# Patient Record
Sex: FEMALE | Race: OTHER | NOT HISPANIC OR LATINO | URBAN - METROPOLITAN AREA
[De-identification: names, ages, dates, MRNs, and addresses within clinical notes are randomized per-mention and may not be internally consistent; named-entity substitution may affect disease eponyms.]

---

## 2019-10-10 ENCOUNTER — EMERGENCY (EMERGENCY)
Facility: HOSPITAL | Age: 24
LOS: 0 days | Discharge: HOME | End: 2019-10-10
Attending: EMERGENCY MEDICINE | Admitting: EMERGENCY MEDICINE
Payer: COMMERCIAL

## 2019-10-10 VITALS
OXYGEN SATURATION: 100 % | RESPIRATION RATE: 16 BRPM | TEMPERATURE: 98 F | DIASTOLIC BLOOD PRESSURE: 80 MMHG | SYSTOLIC BLOOD PRESSURE: 114 MMHG

## 2019-10-10 VITALS
HEART RATE: 90 BPM | OXYGEN SATURATION: 98 % | DIASTOLIC BLOOD PRESSURE: 72 MMHG | WEIGHT: 125 LBS | TEMPERATURE: 96 F | HEIGHT: 65 IN | RESPIRATION RATE: 16 BRPM | SYSTOLIC BLOOD PRESSURE: 130 MMHG

## 2019-10-10 DIAGNOSIS — R11.0 NAUSEA: ICD-10-CM

## 2019-10-10 DIAGNOSIS — R51 HEADACHE: ICD-10-CM

## 2019-10-10 DIAGNOSIS — H53.8 OTHER VISUAL DISTURBANCES: ICD-10-CM

## 2019-10-10 LAB
ALBUMIN SERPL ELPH-MCNC: 4.8 G/DL — SIGNIFICANT CHANGE UP (ref 3.5–5.2)
ALP SERPL-CCNC: 50 U/L — SIGNIFICANT CHANGE UP (ref 30–115)
ALT FLD-CCNC: 10 U/L — SIGNIFICANT CHANGE UP (ref 0–41)
ANION GAP SERPL CALC-SCNC: 15 MMOL/L — HIGH (ref 7–14)
AST SERPL-CCNC: 15 U/L — SIGNIFICANT CHANGE UP (ref 0–41)
BASOPHILS # BLD AUTO: 0.05 K/UL — SIGNIFICANT CHANGE UP (ref 0–0.2)
BASOPHILS NFR BLD AUTO: 0.7 % — SIGNIFICANT CHANGE UP (ref 0–1)
BILIRUB SERPL-MCNC: 0.3 MG/DL — SIGNIFICANT CHANGE UP (ref 0.2–1.2)
BUN SERPL-MCNC: 11 MG/DL — SIGNIFICANT CHANGE UP (ref 10–20)
CALCIUM SERPL-MCNC: 9.7 MG/DL — SIGNIFICANT CHANGE UP (ref 8.5–10.1)
CHLORIDE SERPL-SCNC: 99 MMOL/L — SIGNIFICANT CHANGE UP (ref 98–110)
CO2 SERPL-SCNC: 20 MMOL/L — SIGNIFICANT CHANGE UP (ref 17–32)
CREAT SERPL-MCNC: 0.6 MG/DL — LOW (ref 0.7–1.5)
EOSINOPHIL # BLD AUTO: 0.22 K/UL — SIGNIFICANT CHANGE UP (ref 0–0.7)
EOSINOPHIL NFR BLD AUTO: 3.1 % — SIGNIFICANT CHANGE UP (ref 0–8)
GLUCOSE SERPL-MCNC: 98 MG/DL — SIGNIFICANT CHANGE UP (ref 70–99)
HCG SERPL QL: NEGATIVE — SIGNIFICANT CHANGE UP
HCT VFR BLD CALC: 32.9 % — LOW (ref 37–47)
HGB BLD-MCNC: 8.8 G/DL — LOW (ref 12–16)
IMM GRANULOCYTES NFR BLD AUTO: 0.3 % — SIGNIFICANT CHANGE UP (ref 0.1–0.3)
LYMPHOCYTES # BLD AUTO: 1.62 K/UL — SIGNIFICANT CHANGE UP (ref 1.2–3.4)
LYMPHOCYTES # BLD AUTO: 22.6 % — SIGNIFICANT CHANGE UP (ref 20.5–51.1)
MCHC RBC-ENTMCNC: 17.8 PG — LOW (ref 27–31)
MCHC RBC-ENTMCNC: 26.7 G/DL — LOW (ref 32–37)
MCV RBC AUTO: 66.6 FL — LOW (ref 81–99)
MONOCYTES # BLD AUTO: 0.56 K/UL — SIGNIFICANT CHANGE UP (ref 0.1–0.6)
MONOCYTES NFR BLD AUTO: 7.8 % — SIGNIFICANT CHANGE UP (ref 1.7–9.3)
NEUTROPHILS # BLD AUTO: 4.7 K/UL — SIGNIFICANT CHANGE UP (ref 1.4–6.5)
NEUTROPHILS NFR BLD AUTO: 65.5 % — SIGNIFICANT CHANGE UP (ref 42.2–75.2)
NRBC # BLD: 0 /100 WBCS — SIGNIFICANT CHANGE UP (ref 0–0)
PLATELET # BLD AUTO: 294 K/UL — SIGNIFICANT CHANGE UP (ref 130–400)
POTASSIUM SERPL-MCNC: 4.1 MMOL/L — SIGNIFICANT CHANGE UP (ref 3.5–5)
POTASSIUM SERPL-SCNC: 4.1 MMOL/L — SIGNIFICANT CHANGE UP (ref 3.5–5)
PROT SERPL-MCNC: 8.1 G/DL — HIGH (ref 6–8)
RBC # BLD: 4.94 M/UL — SIGNIFICANT CHANGE UP (ref 4.2–5.4)
RBC # FLD: 19 % — HIGH (ref 11.5–14.5)
SODIUM SERPL-SCNC: 134 MMOL/L — LOW (ref 135–146)
WBC # BLD: 7.17 K/UL — SIGNIFICANT CHANGE UP (ref 4.8–10.8)
WBC # FLD AUTO: 7.17 K/UL — SIGNIFICANT CHANGE UP (ref 4.8–10.8)

## 2019-10-10 PROCEDURE — 99285 EMERGENCY DEPT VISIT HI MDM: CPT

## 2019-10-10 PROCEDURE — 70450 CT HEAD/BRAIN W/O DYE: CPT | Mod: 26

## 2019-10-10 RX ORDER — KETOROLAC TROMETHAMINE 30 MG/ML
15 SYRINGE (ML) INJECTION ONCE
Refills: 0 | Status: DISCONTINUED | OUTPATIENT
Start: 2019-10-10 | End: 2019-10-10

## 2019-10-10 RX ORDER — MAGNESIUM SULFATE 500 MG/ML
2 VIAL (ML) INJECTION ONCE
Refills: 0 | Status: COMPLETED | OUTPATIENT
Start: 2019-10-10 | End: 2019-10-10

## 2019-10-10 RX ORDER — MAGNESIUM SULFATE 500 MG/ML
1 VIAL (ML) INJECTION ONCE
Refills: 0 | Status: DISCONTINUED | OUTPATIENT
Start: 2019-10-10 | End: 2019-10-10

## 2019-10-10 RX ORDER — METOCLOPRAMIDE HCL 10 MG
10 TABLET ORAL ONCE
Refills: 0 | Status: COMPLETED | OUTPATIENT
Start: 2019-10-10 | End: 2019-10-10

## 2019-10-10 RX ORDER — SODIUM CHLORIDE 9 MG/ML
1000 INJECTION, SOLUTION INTRAVENOUS ONCE
Refills: 0 | Status: COMPLETED | OUTPATIENT
Start: 2019-10-10 | End: 2019-10-10

## 2019-10-10 RX ADMIN — Medication 50 GRAM(S): at 15:32

## 2019-10-10 RX ADMIN — SODIUM CHLORIDE 1000 MILLILITER(S): 9 INJECTION, SOLUTION INTRAVENOUS at 15:10

## 2019-10-10 RX ADMIN — Medication 10 MILLIGRAM(S): at 15:10

## 2019-10-10 RX ADMIN — Medication 15 MILLIGRAM(S): at 15:53

## 2019-10-10 NOTE — ED PROVIDER NOTE - PATIENT PORTAL LINK FT
You can access the FollowMyHealth Patient Portal offered by [facility] by registering at the following website: [email or web address]. By joining [employer]’s FollowMyHealth portal, you will also be able to view your health information using other applications (apps) compatible with our system.

## 2019-10-10 NOTE — ED PROVIDER NOTE - PROGRESS NOTE DETAILS
Bedside ultrasound of her eyes showed possible bilateral vitreous detachment; images sent to Dr. Martinez ophthalmology resident who states probably not vitreous detachment and maybe just echo from the ultrasound. Patient's symptoms improved. will give follow up with optho and neurology. return precautions discussed.

## 2019-10-10 NOTE — ED ADULT TRIAGE NOTE - CHIEF COMPLAINT QUOTE
complain of left sided headache and left eye blurry vision and nausea. Pt. just started taking Junel a week ago. LMP Sept. 11

## 2019-10-10 NOTE — ED PROVIDER NOTE - CLINICAL SUMMARY MEDICAL DECISION MAKING FREE TEXT BOX
Results reviewed and d/w patient and family.  Pt with h/o anemia.  Pt feeling better, Bedside optho sono, ? vitreous hemmorahge/detachment.  IMages reviewed with optho.  Pt can follow up tomorrow.  also will f/u with Neuro. Pt instructed to return if any worsening symptoms or concerns.  They verbalize understanding.

## 2019-10-10 NOTE — ED PROVIDER NOTE - CARE PROVIDER_API CALL
Enrique Archer)  Neurology  [address] [place] [address], [address]  [place] [address]  Phone: [phone]  Fax: [phone]  Follow Up Time:

## 2019-10-10 NOTE — ED PROVIDER NOTE - NSFOLLOWUPCLINICS_GEN_ALL_ED_FT
[facility]  Ophthalmolgy  [address], [address]  [place] [address]  Phone: [phone]  Fax:   Follow Up Time:

## 2019-10-10 NOTE — ED PROVIDER NOTE - NS ED ROS FT
Constitutional: No fevers.   Eyes: +blurry vision.   ENMT: No hearing changes, pain, no sore throat or runny nose, no difficulty swallowing  Cardiac:  No chest pain, SOB or edema. No chest pain with exertion.  Respiratory:  No cough or respiratory distress. No hemoptysis. No history of asthma or RAD.  GI:  +nausea.   :  No dysuria, frequency or burning.  MS:  No myalgia, muscle weakness, joint pain or back pain.  Neuro:  +headache. +blurry vision.   Skin:  No skin rash.   Endocrine: No history of thyroid disease or diabetes.

## 2019-10-10 NOTE — ED ADULT NURSE NOTE - NSIMPLEMENTINTERV_GEN_ALL_ED
Implemented All Universal Safety Interventions:  [place] to call system. Call bell, personal items and telephone within reach. Instruct patient to call for assistance. Room bathroom lighting operational. Non-slip footwear when patient is off stretcher. Physically safe environment: no spills, clutter or unnecessary equipment. Stretcher in lowest position, wheels locked, appropriate side rails in place.

## 2019-10-10 NOTE — ED PROVIDER NOTE - NSFOLLOWUPINSTRUCTIONS_ED_ALL_ED_FT
Headache    A headache is pain or discomfort felt around the head or neck area. The specific cause of a headache may not be found as there are many types including tension headaches, migraine headaches, and cluster headaches. Watch your condition for any changes. Things you can do to manage your pain include taking over the counter and prescription medications as instructed by your health care provider, lying down in a dark quiet room, limiting stress, getting regular sleep, and refraining from alcohol and tobacco products.    SEEK IMMEDIATE MEDICAL CARE IF YOU HAVE ANY OF THE FOLLOWING SYMPTOMS: fever, vomiting, stiff neck, loss of vision, problems with speech, muscle weakness, loss of balance, trouble walking, passing out, or confusion.    Blurred Vision, Adult    Having blurred vision means that you cannot see things clearly. Your vision may seem fuzzy or out of focus. It can involve your vision for objects that are close or far away. It may affect one or both eyes. There are many causes of blurred vision, including cataracts, macular degeneration, eye inflammation (uveitis), and diabetic retinopathy.    In many cases, blurred vision has to do with the shape of your eye. An abnormal eye shape means you cannot focus well (refractive error). When this happens, it can cause:  Faraway objects to look blurry (nearsightedness).  Close objects to look blurry (farsightedness).  Blurry vision at any distance (astigmatism).  Refractive errors are often corrected with glasses or contacts.    Blurred vision can be diagnosed based on your symptoms and a physical exam. Tell your health care provider about any other health problems you have, any recent eye injury, and any prior surgeries. You may need to see a health care provider who specializes in eye problems (ophthalmologist). Your treatment will depend on what is causing your blurred vision.    Follow these instructions at home:  Keep all follow-up visits as told by your health care provider. This is important. These include any visits to your eye specialists.  Do not drive or use heavy machinery if your vision is blurry.  Use eye drops only as told by your health care provider.  If you were prescribed glasses or contact lenses, wear the glasses or contacts as told by your health care provider.  Schedule eye exams regularly.  Pay attention to any changes in your symptoms.  Contact a health care provider if:  Your symptoms do not improve or they get worse.  You have:  New symptoms.  A headache.  Trouble seeing at night.  Trouble noticing the difference between colors.  You notice:  Drooping of your eyelids.  Drainage coming from your eyes.  A rash around your eyes.  Get help right away if:  You have:  Severe eye pain.  A severe headache.  A sudden change in vision.  A sudden loss of vision.  A vision change after an injury.  You notice flashing lights in your field of vision. Your field of vision is the area that you can see without moving your eyes.  Summary  Having blurred vision means that you cannot see things clearly. Your vision may seem fuzzy or out of focus.  There are many causes of blurred vision. In many cases, blurred vision has to do with an abnormal eye shape (refractive error), and it can be corrected with glasses or contact lenses.  Pay attention to any changes in your symptoms. Contact a health care provider if your symptoms do not improve or if you have any new symptoms.

## 2019-10-10 NOTE — ED PROVIDER NOTE - OBJECTIVE STATEMENT
Patient is a 23 yo F w/ no pmh p/w headache. Patient endorses sudden onset severe left temporal headache, non-radiating, x 1 hour prior to presentation associated with nausea and intermittent blurry vision and diffuse weakness. No numbness/tingling/focal weakness. No fevers, no trauma, no neck stiffness, no abdominal pain, no vomiting. Patient is a 25 yo F w/ no pmh p/w headache. Patient endorses multiple episodes of left temporal headache, non-radiating for the past month; this episode started 1 hour prior to presentation associated with nausea and intermittent spotty vision from left eye x 1 month and diffuse weakness. No numbness/tingling/focal weakness. No fevers, no trauma, no neck stiffness, no abdominal pain, no vomiting.

## 2019-10-10 NOTE — ED PROVIDER NOTE - PHYSICAL EXAMINATION
CONSTITUTIONAL: Well-developed; well-nourished; appears uncomfortable.   SKIN: warm, dry.  HEAD: Normocephalic; atraumatic.  EYES: PERRL, EOMI, no conjunctival erythema.  ENT: No nasal discharge; airway clear.  NECK: Supple; non tender.  CARD: S1, S2 normal; no murmurs, gallops, or rubs. Regular rate and rhythm.   RESP: No wheezes, rales or rhonchi.  ABD: soft ntnd.  EXT: Normal ROM.  No clubbing, cyanosis or edema.   NEURO: CN III-XII grossly intact, no facial asymmetry, no slurred speech. Strength 5/5 in upper and lower extremities. Sensation intact in all extremities. No pronator drift.   PSYCH: Cooperative, appropriate.

## 2020-03-06 ENCOUNTER — EMERGENCY (EMERGENCY)
Facility: HOSPITAL | Age: 25
LOS: 0 days | Discharge: HOME | End: 2020-03-06
Attending: EMERGENCY MEDICINE | Admitting: EMERGENCY MEDICINE
Payer: MEDICAID

## 2020-03-06 VITALS
DIASTOLIC BLOOD PRESSURE: 75 MMHG | OXYGEN SATURATION: 98 % | RESPIRATION RATE: 24 BRPM | TEMPERATURE: 97 F | WEIGHT: 119.93 LBS | SYSTOLIC BLOOD PRESSURE: 138 MMHG | HEART RATE: 114 BPM

## 2020-03-06 DIAGNOSIS — J45.909 UNSPECIFIED ASTHMA, UNCOMPLICATED: ICD-10-CM

## 2020-03-06 DIAGNOSIS — R06.02 SHORTNESS OF BREATH: ICD-10-CM

## 2020-03-06 DIAGNOSIS — R10.13 EPIGASTRIC PAIN: ICD-10-CM

## 2020-03-06 DIAGNOSIS — Z79.899 OTHER LONG TERM (CURRENT) DRUG THERAPY: ICD-10-CM

## 2020-03-06 DIAGNOSIS — R07.9 CHEST PAIN, UNSPECIFIED: ICD-10-CM

## 2020-03-06 DIAGNOSIS — R00.0 TACHYCARDIA, UNSPECIFIED: ICD-10-CM

## 2020-03-06 LAB
ALBUMIN SERPL ELPH-MCNC: 4.7 G/DL — SIGNIFICANT CHANGE UP (ref 3.5–5.2)
ALP SERPL-CCNC: 63 U/L — SIGNIFICANT CHANGE UP (ref 30–115)
ALT FLD-CCNC: 11 U/L — SIGNIFICANT CHANGE UP (ref 0–41)
ANION GAP SERPL CALC-SCNC: 16 MMOL/L — HIGH (ref 7–14)
ANISOCYTOSIS BLD QL: SIGNIFICANT CHANGE UP
AST SERPL-CCNC: 16 U/L — SIGNIFICANT CHANGE UP (ref 0–41)
BASOPHILS # BLD AUTO: 0.04 K/UL — SIGNIFICANT CHANGE UP (ref 0–0.2)
BASOPHILS NFR BLD AUTO: 0.6 % — SIGNIFICANT CHANGE UP (ref 0–1)
BILIRUB SERPL-MCNC: 0.2 MG/DL — SIGNIFICANT CHANGE UP (ref 0.2–1.2)
BUN SERPL-MCNC: 13 MG/DL — SIGNIFICANT CHANGE UP (ref 10–20)
CALCIUM SERPL-MCNC: 10 MG/DL — SIGNIFICANT CHANGE UP (ref 8.5–10.1)
CHLORIDE SERPL-SCNC: 102 MMOL/L — SIGNIFICANT CHANGE UP (ref 98–110)
CO2 SERPL-SCNC: 20 MMOL/L — SIGNIFICANT CHANGE UP (ref 17–32)
CREAT SERPL-MCNC: 0.7 MG/DL — SIGNIFICANT CHANGE UP (ref 0.7–1.5)
D DIMER BLD IA.RAPID-MCNC: 34 NG/ML DDU — SIGNIFICANT CHANGE UP (ref 0–230)
ELLIPTOCYTES BLD QL SMEAR: SLIGHT — SIGNIFICANT CHANGE UP
EOSINOPHIL # BLD AUTO: 0.09 K/UL — SIGNIFICANT CHANGE UP (ref 0–0.7)
EOSINOPHIL NFR BLD AUTO: 1.5 % — SIGNIFICANT CHANGE UP (ref 0–8)
GLUCOSE SERPL-MCNC: 115 MG/DL — HIGH (ref 70–99)
HCG SERPL QL: NEGATIVE — SIGNIFICANT CHANGE UP
HCT VFR BLD CALC: 34.2 % — LOW (ref 37–47)
HGB BLD-MCNC: 9.4 G/DL — LOW (ref 12–16)
HYPOCHROMIA BLD QL: SLIGHT — SIGNIFICANT CHANGE UP
IMM GRANULOCYTES NFR BLD AUTO: 0.2 % — SIGNIFICANT CHANGE UP (ref 0.1–0.3)
LYMPHOCYTES # BLD AUTO: 2.55 K/UL — SIGNIFICANT CHANGE UP (ref 1.2–3.4)
LYMPHOCYTES # BLD AUTO: 41.3 % — SIGNIFICANT CHANGE UP (ref 20.5–51.1)
MANUAL SMEAR VERIFICATION: SIGNIFICANT CHANGE UP
MCHC RBC-ENTMCNC: 17.8 PG — LOW (ref 27–31)
MCHC RBC-ENTMCNC: 27.5 G/DL — LOW (ref 32–37)
MCV RBC AUTO: 64.7 FL — LOW (ref 81–99)
MICROCYTES BLD QL: SIGNIFICANT CHANGE UP
MONOCYTES # BLD AUTO: 0.6 K/UL — SIGNIFICANT CHANGE UP (ref 0.1–0.6)
MONOCYTES NFR BLD AUTO: 9.7 % — HIGH (ref 1.7–9.3)
NEUTROPHILS # BLD AUTO: 2.88 K/UL — SIGNIFICANT CHANGE UP (ref 1.4–6.5)
NEUTROPHILS NFR BLD AUTO: 46.7 % — SIGNIFICANT CHANGE UP (ref 42.2–75.2)
NRBC # BLD: 0 /100 WBCS — SIGNIFICANT CHANGE UP (ref 0–0)
NT-PROBNP SERPL-SCNC: 22 PG/ML — SIGNIFICANT CHANGE UP (ref 0–300)
PLAT MORPH BLD: NORMAL — SIGNIFICANT CHANGE UP
PLATELET # BLD AUTO: 305 K/UL — SIGNIFICANT CHANGE UP (ref 130–400)
PLATELET COUNT - ESTIMATE: NORMAL — SIGNIFICANT CHANGE UP
POTASSIUM SERPL-MCNC: 3.8 MMOL/L — SIGNIFICANT CHANGE UP (ref 3.5–5)
POTASSIUM SERPL-SCNC: 3.8 MMOL/L — SIGNIFICANT CHANGE UP (ref 3.5–5)
PROT SERPL-MCNC: 8.2 G/DL — HIGH (ref 6–8)
RBC # BLD: 5.29 M/UL — SIGNIFICANT CHANGE UP (ref 4.2–5.4)
RBC # FLD: 18 % — HIGH (ref 11.5–14.5)
RBC BLD AUTO: ABNORMAL
SCHISTOCYTES BLD QL AUTO: SLIGHT — SIGNIFICANT CHANGE UP
SODIUM SERPL-SCNC: 138 MMOL/L — SIGNIFICANT CHANGE UP (ref 135–146)
TROPONIN T SERPL-MCNC: <0.01 NG/ML — SIGNIFICANT CHANGE UP
WBC # BLD: 6.17 K/UL — SIGNIFICANT CHANGE UP (ref 4.8–10.8)
WBC # FLD AUTO: 6.17 K/UL — SIGNIFICANT CHANGE UP (ref 4.8–10.8)

## 2020-03-06 PROCEDURE — 71045 X-RAY EXAM CHEST 1 VIEW: CPT | Mod: 26

## 2020-03-06 PROCEDURE — 99285 EMERGENCY DEPT VISIT HI MDM: CPT

## 2020-03-06 RX ORDER — DIPHENHYDRAMINE HYDROCHLORIDE AND LIDOCAINE HYDROCHLORIDE AND ALUMINUM HYDROXIDE AND MAGNESIUM HYDRO
30 KIT ONCE
Refills: 0 | Status: COMPLETED | OUTPATIENT
Start: 2020-03-06 | End: 2020-03-06

## 2020-03-06 RX ORDER — FAMOTIDINE 10 MG/ML
20 INJECTION INTRAVENOUS ONCE
Refills: 0 | Status: COMPLETED | OUTPATIENT
Start: 2020-03-06 | End: 2020-03-06

## 2020-03-06 RX ORDER — SODIUM CHLORIDE 9 MG/ML
1000 INJECTION INTRAMUSCULAR; INTRAVENOUS; SUBCUTANEOUS ONCE
Refills: 0 | Status: COMPLETED | OUTPATIENT
Start: 2020-03-06 | End: 2020-03-06

## 2020-03-06 RX ADMIN — DIPHENHYDRAMINE HYDROCHLORIDE AND LIDOCAINE HYDROCHLORIDE AND ALUMINUM HYDROXIDE AND MAGNESIUM HYDRO 30 MILLILITER(S): KIT at 04:44

## 2020-03-06 RX ADMIN — FAMOTIDINE 20 MILLIGRAM(S): 10 INJECTION INTRAVENOUS at 04:44

## 2020-03-06 RX ADMIN — SODIUM CHLORIDE 1000 MILLILITER(S): 9 INJECTION INTRAMUSCULAR; INTRAVENOUS; SUBCUTANEOUS at 03:58

## 2020-03-06 NOTE — ED PROVIDER NOTE - PATIENT PORTAL LINK FT
You can access the FollowMyHealth Patient Portal offered by [facility] by registering at the following website: [email or web address]. By joining [employer]’s FollowMyHealth portal, you will also be able to view your health information using other applications (apps) compatible with our system.

## 2020-03-06 NOTE — ED PROVIDER NOTE - PROGRESS NOTE DETAILS
Tachycardia and sob resolved after approx 30 minutes in ED, fluids running. Pt still c/o epigastric discomfort, states has pain often after PO intake. Will trial pepcid/ GI meds. Pt with symptomatic improvement after meds. Stable for discharge. Will recommend GI f/u

## 2020-03-06 NOTE — ED PROVIDER NOTE - CLINICAL SUMMARY MEDICAL DECISION MAKING FREE TEXT BOX
23yo F presents with SOB, tachycardia, epigastric discomfort. Labs WNL including negative ddimer. CXR clear. EKG nonischemic. Epigastric symptoms improved with pepcid/ hurricane solution, suggestive of GI etiology of symptoms. Tachycardia and SOB largely self resolving soon after arrival in ED. Suggested PMD and GI f/u. Stable at time of discharge. Return precautions given.

## 2020-03-06 NOTE — ED PROVIDER NOTE - OBJECTIVE STATEMENT
pt is a 24 yof w/ asthma here for chest pain, sob, fast heart rate for 3 months. pt states she woke up 1 hour PTA with the chest pain. pt states it radiates from her stomach. denies cough, fever, n/v/d, villanueva

## 2020-03-06 NOTE — ED PROVIDER NOTE - CARE PROVIDER_API CALL
Mikhail Ling)  Gastroenterology; Internal Medicine  [address] [place] [address]  Phone: [phone]  Fax: [phone]  Follow Up Time:

## 2020-03-06 NOTE — ED PROVIDER NOTE - NS ED ROS FT
Eyes:  No visual changes, eye pain or discharge.  ENMT:  No hearing changes, pain, discharge or infections. No neck pain or stiffness.  Cardiac:  +cp,+ SOB. no edema. No chest pain with exertion.  Respiratory:  No cough or respiratory distress. No hemoptysis. No history of asthma or RAD.  GI:  No nausea, vomiting, diarrhea. +abd pain  :  No dysuria, frequency or burning.  MS:  No myalgia, muscle weakness, joint pain or back pain.  Neuro:  No headache or weakness.  No LOC.  Skin:  No skin rash.   Endocrine: No history of thyroid disease or diabetes.

## 2020-03-06 NOTE — ED PROVIDER NOTE - NSFOLLOWUPINSTRUCTIONS_ED_ALL_ED_FT
You may have reflux disease.    Be sure to take precautions to limit/avoid symptoms. This includes eating small, frequent meals instead of 3 large meals per day. This also includes limiting consumption of: spicy foods, alcohol, fatty foods, chocolate, coffee, peppermint, tomatoes/tomato products. Avoid laying down for at least 3 hours after a meal. Also, you may benefit from using medications to control the acid in your stomach called proton pump inhibitors or H2 blockers, some examples of these that can be purchased over the counter include Pantoprazole, Omeprazole, Ranitidine.

## 2020-03-06 NOTE — ED PROVIDER NOTE - ATTENDING CONTRIBUTION TO CARE
23yo F with PMHx asthma presents for palpitations and SOB for the past 3 hours, gradual onset, worsening. Pt states symptoms began while she was sitting watching TV, felt like her heart racing. Assoc with nausea. Denies CP, cough, fever. Denies headache, dizziness, vomiting, diarrhea, abd pain, dysuria. Traveled to [place] in January via plane, has been well since her trip until today. No leg swelling, hemopytsis, OCP/hormone use, h/o DVT/PE, h/o malignancy.     Vital signs reviewed  GENERAL: Patient nontoxic appearing, NAD  HEAD: NCAT  EYES: Anicteric  ENT: MMM  RESPIRATORY: Tachypneic. No retractions. Speaking in full sentences. CTA B/L. No wheezing, rales, rhonchi. No stridor.   CARDIOVASCULAR: Tachycardic, regular rhythm  ABDOMEN: Soft. Nondistended. Nontender. No guarding or rebound.   MUSCULOSKELETAL/EXTREMITIES: Brisk cap refill. Equal radial pulses. No leg edema. No calf tenderness.  SKIN:  Warm and dry  NEURO: AAOx3. No gross FND. 23yo F with PMHx asthma presents for palpitations and SOB for the past 3 hours, gradual onset, worsening. Pt states symptoms began while she was sitting watching TV, felt like her heart racing. Assoc with nausea, epigastric discomfort. Denies CP, cough, fever. Denies headache, dizziness, vomiting, diarrhea, dysuria. Traveled to [place] in January via plane, has been well since her trip until today. No leg swelling, hemopytsis, OCP/hormone use, h/o DVT/PE, h/o malignancy.     Patient's relatives at bedside, she prefers them for translation (translation services offered).    Vital signs reviewed  GENERAL: Patient nontoxic appearing, NAD  HEAD: NCAT  EYES: Anicteric  ENT: MMM  RESPIRATORY: Tachypneic. No retractions. Speaking in full sentences. CTA B/L. No wheezing, rales, rhonchi. No stridor.   CARDIOVASCULAR: Tachycardic, regular rhythm  ABDOMEN: Soft. Nondistended. Nontender. No guarding or rebound.   MUSCULOSKELETAL/EXTREMITIES: Brisk cap refill. Equal radial pulses. No leg edema. No calf tenderness.  SKIN:  Warm and dry  NEURO: AAOx3. No gross FND.

## 2020-07-14 NOTE — ED ADULT TRIAGE NOTE - HEIGHT IN FEET
Patient alert, cooperative, attempted to speak, seated upright in chair, pacific  used for Thai translation 5

## 2020-09-16 PROBLEM — J45.909 UNSPECIFIED ASTHMA, UNCOMPLICATED: Chronic | Status: ACTIVE | Noted: 2020-03-06

## 2020-11-12 PROBLEM — Z00.00 ENCOUNTER FOR PREVENTIVE HEALTH EXAMINATION: Status: ACTIVE | Noted: 2020-11-12

## 2020-11-16 ENCOUNTER — APPOINTMENT (OUTPATIENT)
Dept: OTOLARYNGOLOGY | Facility: CLINIC | Age: 25
End: 2020-11-16
Payer: COMMERCIAL

## 2020-11-16 VITALS — TEMPERATURE: 98.4 F | BODY MASS INDEX: 24.16 KG/M2 | WEIGHT: 145 LBS | HEIGHT: 65 IN

## 2020-11-16 DIAGNOSIS — Z98.890 OTHER SPECIFIED POSTPROCEDURAL STATES: ICD-10-CM

## 2020-11-16 DIAGNOSIS — R43.0 ANOSMIA: ICD-10-CM

## 2020-11-16 DIAGNOSIS — Z78.9 OTHER SPECIFIED HEALTH STATUS: ICD-10-CM

## 2020-11-16 DIAGNOSIS — J34.89 OTHER SPECIFIED DISORDERS OF NOSE AND NASAL SINUSES: ICD-10-CM

## 2020-11-16 PROCEDURE — 31237 NSL/SINS NDSC SURG BX POLYPC: CPT | Mod: RT

## 2020-11-16 PROCEDURE — 99072 ADDL SUPL MATRL&STAF TM PHE: CPT

## 2020-11-16 PROCEDURE — 31233 NSL/SINS NDSC DX MAX SINUSC: CPT | Mod: LT

## 2020-11-16 PROCEDURE — 99204 OFFICE O/P NEW MOD 45 MIN: CPT | Mod: 25

## 2020-11-16 RX ORDER — CETIRIZINE HCL 10 MG
TABLET ORAL
Refills: 0 | Status: ACTIVE | COMMUNITY

## 2020-11-16 NOTE — HISTORY OF PRESENT ILLNESS
[de-identified] : 11/16/2020 Pat comes in today accompanied by her  c/o sinusitis. Pt states pt has surgery on deviated  septum in 2014 and 2016.   Since last year the left side has been getting clogged. Pt denies epistaxis. \par She had anosmia about 8months ago, now getting better after using azelastine and medrol.\par Pt has been using Flonase and Azelastine and Zyrtec for the nasal passage. She notes right rhinorrhea at times. \par Never smoked. \par History of adenotonsillectomy.

## 2020-11-16 NOTE — ASSESSMENT
[FreeTextEntry1] : counseled on stopping qtips.\par I explained that the patient has scar tissue on both sides and possible polyps.\par \par RTC after CT results.

## 2020-12-07 ENCOUNTER — TRANSCRIPTION ENCOUNTER (OUTPATIENT)
Age: 25
End: 2020-12-07

## 2021-03-19 ENCOUNTER — APPOINTMENT (OUTPATIENT)
Dept: PEDIATRIC ALLERGY IMMUNOLOGY | Facility: CLINIC | Age: 26
End: 2021-03-19

## 2021-03-30 ENCOUNTER — TRANSCRIPTION ENCOUNTER (OUTPATIENT)
Age: 26
End: 2021-03-30

## 2021-04-07 ENCOUNTER — TRANSCRIPTION ENCOUNTER (OUTPATIENT)
Age: 26
End: 2021-04-07

## 2021-04-29 ENCOUNTER — OUTPATIENT (OUTPATIENT)
Dept: OUTPATIENT SERVICES | Facility: HOSPITAL | Age: 26
LOS: 1 days | Discharge: HOME | End: 2021-04-29

## 2021-04-29 ENCOUNTER — APPOINTMENT (OUTPATIENT)
Dept: OBGYN | Facility: CLINIC | Age: 26
End: 2021-04-29
Payer: COMMERCIAL

## 2021-04-29 VITALS
SYSTOLIC BLOOD PRESSURE: 120 MMHG | HEIGHT: 65 IN | DIASTOLIC BLOOD PRESSURE: 80 MMHG | WEIGHT: 156 LBS | BODY MASS INDEX: 25.99 KG/M2

## 2021-04-29 DIAGNOSIS — N64.4 MASTODYNIA: ICD-10-CM

## 2021-04-29 DIAGNOSIS — Z01.419 ENCOUNTER FOR GYNECOLOGICAL EXAMINATION (GENERAL) (ROUTINE) W/OUT ABNORMAL FINDINGS: ICD-10-CM

## 2021-04-29 PROCEDURE — 99385 PREV VISIT NEW AGE 18-39: CPT

## 2021-04-29 PROCEDURE — 99202 OFFICE O/P NEW SF 15 MIN: CPT | Mod: 25

## 2021-04-29 NOTE — DISCUSSION/SUMMARY
[FreeTextEntry1] : 24yo G0 for annual exam w/ PCOS and pelvic pain\par #annual exa\par -pap/vaginitis\par -genetic counseling for strong family h/o cancer, r/o maya syndrome\par \par #pelvic pain\par -US ordered\par \par #PCOS\par -hormonal blood work\par -pelvic US\par \par #AUB\par -discussed risks/benefits of OCPs including cancer prevention\par -discussed expected bleeding side effects of various methods\par -5mg BID northethindrone worked best for patient to stop heavy menses, rx sent to pharmacy\par -discussed taking TXA w/ menses to decrease volume of bleeding, sent to pharmacy\par \par f/u in 1-2 months

## 2021-04-29 NOTE — HISTORY OF PRESENT ILLNESS
[FreeTextEntry1] : Pt prefers to have  translate Slovenian\par \par 26yo G0 LMP 4/6/21 here to establish care with complaint of pelvic pain and heavy menses.  Pt was seen in [place] 2 years ago for similar complaint, diagnosed with PCOS, and was given a medication (metformin?) as well as multiple types of birth control, including Junel, Nizta, and norethindrone.  Pt reports that w/ Junel she had prolonged bleeding, most recently she was on norethindrone 5mg BID for 6 months and had some break through bleeding, stopped taking about a month ago.  Started having a period on 4/6/21 and has had on and off bleeding since then.\par \par Pt wants to try getting pregnancy around Oct of this year, not at this time, wants to know the birth control she has been taking will affect that.\par \par Denies PMH\par Denies PSH\par no allergies\par Family Hx: mom w/ colon cancer, mat aunt uterine cancer, mat uncle blood cancer\par

## 2021-04-29 NOTE — COUNSELING
[Nutrition/ Exercise/ Weight Management] : nutrition, exercise, weight management [Contraception/ Emergency Contraception/ Safe Sexual Practices] : contraception, emergency contraception, safe sexual practices [Preconception Care/ Fertility options] : preconception care, fertility options

## 2021-04-30 DIAGNOSIS — N93.9 ABNORMAL UTERINE AND VAGINAL BLEEDING, UNSPECIFIED: ICD-10-CM

## 2021-04-30 DIAGNOSIS — R10.2 PELVIC AND PERINEAL PAIN: ICD-10-CM

## 2021-04-30 DIAGNOSIS — N64.4 MASTODYNIA: ICD-10-CM

## 2021-04-30 DIAGNOSIS — Z01.419 ENCOUNTER FOR GYNECOLOGICAL EXAMINATION (GENERAL) (ROUTINE) WITHOUT ABNORMAL FINDINGS: ICD-10-CM

## 2021-05-17 LAB — CYTOLOGY CVX/VAG DOC THIN PREP: NORMAL

## 2021-05-26 ENCOUNTER — APPOINTMENT (OUTPATIENT)
Age: 26
End: 2021-05-26

## 2021-05-27 ENCOUNTER — OUTPATIENT (OUTPATIENT)
Dept: OUTPATIENT SERVICES | Facility: HOSPITAL | Age: 26
LOS: 1 days | Discharge: HOME | End: 2021-05-27
Payer: MEDICAID

## 2021-05-27 ENCOUNTER — RESULT REVIEW (OUTPATIENT)
Age: 26
End: 2021-05-27

## 2021-05-27 DIAGNOSIS — N64.4 MASTODYNIA: ICD-10-CM

## 2021-05-27 DIAGNOSIS — N63.10 UNSPECIFIED LUMP IN THE RIGHT BREAST, UNSPECIFIED QUADRANT: ICD-10-CM

## 2021-05-27 PROCEDURE — 76641 ULTRASOUND BREAST COMPLETE: CPT | Mod: 26,RT

## 2021-05-28 LAB
25(OH)D3 SERPL-MCNC: 20 NG/ML
ESTRADIOL SERPL-MCNC: 10 PG/ML
FSH SERPL-MCNC: 7 IU/L
LH SERPL-ACNC: 3.1 IU/L
PROLACTIN SERPL-MCNC: 13.1 NG/ML
TSH SERPL-ACNC: 3.38 UIU/ML

## 2021-06-02 LAB
TESTOST BND SERPL-MCNC: 1.5 PG/ML
TESTOST SERPL-MCNC: 23.7 NG/DL

## 2021-06-03 ENCOUNTER — NON-APPOINTMENT (OUTPATIENT)
Age: 26
End: 2021-06-03

## 2021-06-07 ENCOUNTER — TRANSCRIPTION ENCOUNTER (OUTPATIENT)
Age: 26
End: 2021-06-07

## 2021-06-07 ENCOUNTER — RESULT REVIEW (OUTPATIENT)
Age: 26
End: 2021-06-07

## 2021-06-07 ENCOUNTER — OUTPATIENT (OUTPATIENT)
Dept: OUTPATIENT SERVICES | Facility: HOSPITAL | Age: 26
LOS: 1 days | Discharge: HOME | End: 2021-06-07
Payer: MEDICAID

## 2021-06-07 DIAGNOSIS — N93.9 ABNORMAL UTERINE AND VAGINAL BLEEDING, UNSPECIFIED: ICD-10-CM

## 2021-06-07 PROCEDURE — 76856 US EXAM PELVIC COMPLETE: CPT | Mod: 26

## 2021-06-07 PROCEDURE — 76830 TRANSVAGINAL US NON-OB: CPT | Mod: 26

## 2021-06-09 ENCOUNTER — NON-APPOINTMENT (OUTPATIENT)
Age: 26
End: 2021-06-09

## 2021-06-11 ENCOUNTER — OUTPATIENT (OUTPATIENT)
Dept: OUTPATIENT SERVICES | Facility: HOSPITAL | Age: 26
LOS: 1 days | Discharge: HOME | End: 2021-06-11

## 2021-06-11 ENCOUNTER — APPOINTMENT (OUTPATIENT)
Dept: OBGYN | Facility: CLINIC | Age: 26
End: 2021-06-11
Payer: COMMERCIAL

## 2021-06-11 VITALS
BODY MASS INDEX: 25.66 KG/M2 | WEIGHT: 154 LBS | DIASTOLIC BLOOD PRESSURE: 60 MMHG | HEIGHT: 65 IN | SYSTOLIC BLOOD PRESSURE: 96 MMHG

## 2021-06-11 PROCEDURE — 99214 OFFICE O/P EST MOD 30 MIN: CPT

## 2021-06-11 NOTE — HISTORY OF PRESENT ILLNESS
[FreeTextEntry1] : 27yo accompanied by , preferring him for translation, following up of results for pelvic pain/infertility/aub\par Has starting using norethindrone and has much decreased bleeding, but some irregular spotting.  Has not had to use TXA\par Bloodwork reviewed showing only low vitamin D, but otherwise normal values\par Sonogram report reviewed in detail which shows uterine septum.\par Also reviewed normal genetic cancer testing 2/2 family history.

## 2021-06-11 NOTE — DISCUSSION/SUMMARY
[FreeTextEntry1] : 27yo with AUB, new diagnosis of uterine septum\par -discussed significance of this finding on possible heavy menses, pain, infertility, miscarriage,  delivery\par -recommend further testing with HSG and MRI to better evaluate cavity and shape of uterus, patency of tubes\par -referral to [facility] given to discuss possible surgical correction\par -continue norethindrone, may use short course of OCP to regulate spotting, sent to pharmacy\par -f/u in 2 months

## 2021-08-12 ENCOUNTER — OUTPATIENT (OUTPATIENT)
Dept: OUTPATIENT SERVICES | Facility: HOSPITAL | Age: 26
LOS: 1 days | Discharge: HOME | End: 2021-08-12

## 2021-08-12 ENCOUNTER — APPOINTMENT (OUTPATIENT)
Dept: OBGYN | Facility: CLINIC | Age: 26
End: 2021-08-12
Payer: COMMERCIAL

## 2021-08-12 VITALS
SYSTOLIC BLOOD PRESSURE: 100 MMHG | HEIGHT: 65 IN | WEIGHT: 152 LBS | DIASTOLIC BLOOD PRESSURE: 60 MMHG | BODY MASS INDEX: 25.33 KG/M2

## 2021-08-12 DIAGNOSIS — Q51.9 CONGENITAL MALFORMATION OF UTERUS AND CERVIX, UNSPECIFIED: ICD-10-CM

## 2021-08-12 PROCEDURE — 99212 OFFICE O/P EST SF 10 MIN: CPT

## 2021-08-12 NOTE — HISTORY OF PRESENT ILLNESS
[FreeTextEntry1] : 25yo P0 here w/  for results discussion\par Was not able to see [facility] practice on [place] due to insurance, but was seen in [place] by a provider who doesn't do surgery for uterine septum. \par Pt had MRI at outside facility confirming septum, ruling out bicornuate uterus, and also diagnosis of adenomyosis\par Discussed that adenomyosis is likely cause of pain/heavy bleeding w/ menses rather than septum.  Pt has not yet been trying to actively get pregnant, will start trying in October, discussed seeing other doctors to have septum corrected, but can also try getting pregnant regardless if unable to, and may not have any issue, but won't know until trying.\par Currently not taking any hormonal meds/BC, does not desire to at this time, only pain medication w/ menses.

## 2021-08-12 NOTE — DISCUSSION/SUMMARY
[FreeTextEntry1] : 25yo w/ uterine septum, adenomyosis, heavy/painful menses\par -records scanned into EMR\par -reviewed all diagnoses w/ patient and \par -pt travelling to [place] next month, encouraged to seek opinion there since she has insurance there\par -otherwise can attempt rpegnancy any time\par -f/u in 6 mo or sooner if needed

## 2021-10-10 NOTE — ED PROVIDER NOTE - ATTENDING CONTRIBUTION TO CARE
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  + nausea, no vomiting, no numbness or tingling.  Pt recently started OCPs.  no fevers or chills, On exam pt in NAD AAO x 3, PERRL EOMI, face symmetric, neck supple, no rash, abd soft nt nd, no edema, good tone, equal strength

## 2021-11-30 ENCOUNTER — OUTPATIENT (OUTPATIENT)
Dept: OUTPATIENT SERVICES | Facility: HOSPITAL | Age: 26
LOS: 1 days | Discharge: HOME | End: 2021-11-30

## 2021-11-30 ENCOUNTER — APPOINTMENT (OUTPATIENT)
Dept: OBGYN | Facility: CLINIC | Age: 26
End: 2021-11-30
Payer: COMMERCIAL

## 2021-11-30 ENCOUNTER — RESULT CHARGE (OUTPATIENT)
Age: 26
End: 2021-11-30

## 2021-11-30 VITALS
DIASTOLIC BLOOD PRESSURE: 60 MMHG | SYSTOLIC BLOOD PRESSURE: 108 MMHG | BODY MASS INDEX: 25.66 KG/M2 | HEIGHT: 65 IN | WEIGHT: 154 LBS

## 2021-11-30 DIAGNOSIS — Z34.01 ENCOUNTER FOR SUPERVISION OF NORMAL FIRST PREGNANCY, FIRST TRIMESTER: ICD-10-CM

## 2021-11-30 PROCEDURE — 99213 OFFICE O/P EST LOW 20 MIN: CPT | Mod: 25

## 2021-11-30 PROCEDURE — 76815 OB US LIMITED FETUS(S): CPT | Mod: 26

## 2021-12-01 LAB
BILIRUB UR QL STRIP: NORMAL
C TRACH RRNA SPEC QL NAA+PROBE: NOT DETECTED
CLARITY UR: CLEAR
COLLECTION METHOD: NORMAL
GLUCOSE UR-MCNC: NORMAL
HCG UR QL: 0.2 EU/DL
HGB UR QL STRIP.AUTO: NORMAL
KETONES UR-MCNC: NORMAL
LEUKOCYTE ESTERASE UR QL STRIP: NORMAL
N GONORRHOEA RRNA SPEC QL NAA+PROBE: NOT DETECTED
NITRITE UR QL STRIP: NORMAL
PH UR STRIP: 6
PROT UR STRIP-MCNC: NORMAL
SOURCE AMPLIFICATION: NORMAL
SP GR UR STRIP: 1.03

## 2021-12-01 RX ORDER — NORETHINDRONE ACETATE 5 MG/1
5 TABLET ORAL
Qty: 60 | Refills: 2 | Status: DISCONTINUED | COMMUNITY
Start: 2021-04-29 | End: 2021-12-01

## 2021-12-01 RX ORDER — DOXYCYCLINE 100 MG/1
100 CAPSULE ORAL TWICE DAILY
Qty: 10 | Refills: 0 | Status: DISCONTINUED | COMMUNITY
Start: 2021-06-11 | End: 2021-12-01

## 2021-12-01 RX ORDER — NORETHINDRONE ACETATE AND ETHINYL ESTRADIOL 1.5; 3 MG/1; UG/1
1.5-3 TABLET ORAL DAILY
Qty: 5 | Refills: 0 | Status: DISCONTINUED | COMMUNITY
Start: 2021-06-11 | End: 2021-12-01

## 2021-12-01 RX ORDER — ALBUTEROL SULFATE 90 UG/1
108 (90 BASE) INHALANT RESPIRATORY (INHALATION)
Qty: 8 | Refills: 0 | Status: ACTIVE | COMMUNITY
Start: 2021-08-23

## 2021-12-01 RX ORDER — TRANEXAMIC ACID 650 MG/1
650 TABLET ORAL 3 TIMES DAILY
Qty: 30 | Refills: 3 | Status: DISCONTINUED | COMMUNITY
Start: 2021-04-29 | End: 2021-12-01

## 2021-12-21 ENCOUNTER — LABORATORY RESULT (OUTPATIENT)
Age: 26
End: 2021-12-21

## 2021-12-21 ENCOUNTER — NON-APPOINTMENT (OUTPATIENT)
Age: 26
End: 2021-12-21

## 2021-12-22 ENCOUNTER — NON-APPOINTMENT (OUTPATIENT)
Age: 26
End: 2021-12-22

## 2021-12-22 ENCOUNTER — OUTPATIENT (OUTPATIENT)
Dept: OUTPATIENT SERVICES | Facility: HOSPITAL | Age: 26
LOS: 1 days | Discharge: HOME | End: 2021-12-22

## 2021-12-22 ENCOUNTER — ASOB RESULT (OUTPATIENT)
Age: 26
End: 2021-12-22

## 2021-12-22 ENCOUNTER — APPOINTMENT (OUTPATIENT)
Dept: ANTEPARTUM | Facility: CLINIC | Age: 26
End: 2021-12-22
Payer: COMMERCIAL

## 2021-12-22 ENCOUNTER — APPOINTMENT (OUTPATIENT)
Dept: OBGYN | Facility: CLINIC | Age: 26
End: 2021-12-22
Payer: COMMERCIAL

## 2021-12-22 PROCEDURE — 76813 OB US NUCHAL MEAS 1 GEST: CPT | Mod: 26

## 2022-01-03 ENCOUNTER — NON-APPOINTMENT (OUTPATIENT)
Age: 27
End: 2022-01-03

## 2022-01-04 ENCOUNTER — APPOINTMENT (OUTPATIENT)
Dept: OBGYN | Facility: CLINIC | Age: 27
End: 2022-01-04

## 2022-01-04 ENCOUNTER — OUTPATIENT (OUTPATIENT)
Dept: OUTPATIENT SERVICES | Facility: HOSPITAL | Age: 27
LOS: 1 days | Discharge: HOME | End: 2022-01-04

## 2022-01-04 ENCOUNTER — APPOINTMENT (OUTPATIENT)
Dept: OBGYN | Facility: CLINIC | Age: 27
End: 2022-01-04
Payer: COMMERCIAL

## 2022-01-04 VITALS — DIASTOLIC BLOOD PRESSURE: 60 MMHG | WEIGHT: 152 LBS | BODY MASS INDEX: 25.29 KG/M2 | SYSTOLIC BLOOD PRESSURE: 90 MMHG

## 2022-01-04 LAB
ABO + RH PNL BLD: NORMAL
AR GENE MUT ANL BLD/T: NORMAL
BACTERIA UR CULT: NORMAL
BASOPHILS # BLD AUTO: 0.02 K/UL
BASOPHILS NFR BLD AUTO: 0.4 %
BILIRUB UR QL STRIP: NORMAL
BLD GP AB SCN SERPL QL: NORMAL
CLARITY UR: NORMAL
COLLECTION METHOD: NORMAL
EOSINOPHIL # BLD AUTO: 0.17 K/UL
EOSINOPHIL NFR BLD AUTO: 3.3 %
FMR1 GENE MUT ANL BLD/T: NORMAL
GLUCOSE UR-MCNC: NORMAL
HBV SURFACE AG SERPL QL IA: NONREACTIVE
HCG UR QL: 0.2 EU/DL
HCT VFR BLD CALC: 40.4 %
HGB A MFR BLD: 96.9 %
HGB A2 MFR BLD: 2.6 %
HGB BLD-MCNC: 13.4 G/DL
HGB F MFR BLD: 0.5 %
HGB FRACT BLD-IMP: NORMAL
HGB UR QL STRIP.AUTO: NORMAL
HIV1+2 AB SPEC QL IA.RAPID: NONREACTIVE
IMM GRANULOCYTES NFR BLD AUTO: 0.2 %
KETONES UR-MCNC: NORMAL
LEAD BLD-MCNC: <1 UG/DL
LEUKOCYTE ESTERASE UR QL STRIP: NORMAL
LYMPHOCYTES # BLD AUTO: 2.11 K/UL
LYMPHOCYTES NFR BLD AUTO: 40.6 %
MAN DIFF?: NORMAL
MCHC RBC-ENTMCNC: 29.5 PG
MCHC RBC-ENTMCNC: 33.2 G/DL
MCV RBC AUTO: 89 FL
MEV IGG FLD QL IA: 17.3 AU/ML
MEV IGG+IGM SER-IMP: POSITIVE
MONOCYTES # BLD AUTO: 0.45 K/UL
MONOCYTES NFR BLD AUTO: 8.7 %
NEUTROPHILS # BLD AUTO: 2.44 K/UL
NEUTROPHILS NFR BLD AUTO: 46.8 %
NITRITE UR QL STRIP: NORMAL
PH UR STRIP: 6
PLATELET # BLD AUTO: 197 K/UL
PROT UR STRIP-MCNC: NORMAL
RBC # BLD: 4.54 M/UL
RBC # FLD: 14.7 %
RUBV IGG FLD-ACNC: 12.7 INDEX
RUBV IGG SER-IMP: POSITIVE
SP GR UR STRIP: 1.03
T PALLIDUM AB SER QL IA: NEGATIVE
VZV AB TITR SER: POSITIVE
VZV IGG SER IF-ACNC: 248.5 INDEX
WBC # FLD AUTO: 5.2 K/UL

## 2022-01-04 PROCEDURE — 99213 OFFICE O/P EST LOW 20 MIN: CPT

## 2022-01-11 LAB — CFTR MUT TESTED BLD/T: NEGATIVE

## 2022-01-13 ENCOUNTER — NON-APPOINTMENT (OUTPATIENT)
Age: 27
End: 2022-01-13

## 2022-01-14 LAB
CLARIM 22Q11.2: NORMAL
CLARIM ADDITIONAL INFO: NORMAL
CLARIM CHROMOSOME 13: NORMAL
CLARIM CHROMOSOME 18: NORMAL
CLARIM CHROMOSOME 21: NORMAL
CLARIM SEX CHROMOSOMES: NORMAL
CLARITEST NIPT W/MICRO: NORMAL
MATERNAL WEIGHT (LBS):: 152
PLEASE INCLUDE GENDER RESULTS ON THIS REPORT:: NORMAL
TYPE OF PREGNANCY:: NORMAL

## 2022-01-27 ENCOUNTER — NON-APPOINTMENT (OUTPATIENT)
Age: 27
End: 2022-01-27

## 2022-01-28 ENCOUNTER — NON-APPOINTMENT (OUTPATIENT)
Age: 27
End: 2022-01-28

## 2022-01-28 ENCOUNTER — RESULT CHARGE (OUTPATIENT)
Age: 27
End: 2022-01-28

## 2022-01-28 ENCOUNTER — OUTPATIENT (OUTPATIENT)
Dept: OUTPATIENT SERVICES | Facility: HOSPITAL | Age: 27
LOS: 1 days | Discharge: HOME | End: 2022-01-28

## 2022-01-28 ENCOUNTER — APPOINTMENT (OUTPATIENT)
Dept: ANTEPARTUM | Facility: CLINIC | Age: 27
End: 2022-01-28
Payer: COMMERCIAL

## 2022-01-28 VITALS
HEART RATE: 104 BPM | DIASTOLIC BLOOD PRESSURE: 69 MMHG | HEIGHT: 65 IN | SYSTOLIC BLOOD PRESSURE: 111 MMHG | TEMPERATURE: 98.1 F | WEIGHT: 157 LBS | BODY MASS INDEX: 26.16 KG/M2 | OXYGEN SATURATION: 99 %

## 2022-01-28 DIAGNOSIS — Z80.0 FAMILY HISTORY OF MALIGNANT NEOPLASM OF DIGESTIVE ORGANS: ICD-10-CM

## 2022-01-28 DIAGNOSIS — Z87.09 PERSONAL HISTORY OF OTHER DISEASES OF THE RESPIRATORY SYSTEM: ICD-10-CM

## 2022-01-28 DIAGNOSIS — Z80.49 FAMILY HISTORY OF MALIGNANT NEOPLASM OF OTHER GENITAL ORGANS: ICD-10-CM

## 2022-01-28 LAB
BILIRUB UR QL STRIP: NORMAL
BP DIAS: 69 MM HG
BP SYS: 111 MM HG
CLARITY UR: CLEAR
COLLECTION METHOD: NORMAL
FETAL HEART RATE (BPM): 152
GLUCOSE UR-MCNC: NORMAL
HCG UR QL: 0.2 EU/DL
HGB UR QL STRIP.AUTO: NORMAL
KETONES UR-MCNC: NORMAL
LEUKOCYTE ESTERASE UR QL STRIP: NORMAL
NITRITE UR QL STRIP: NORMAL
OB COMMENTS: NORMAL
PH UR STRIP: 5
PROT UR STRIP-MCNC: NORMAL
SCHEDULED VISIT: YES
SP GR UR STRIP: 1.03
URINE ALBUMIN/PROTEIN: NORMAL
URINE GLUCOSE: NORMAL
URINE KETONES: NORMAL
WEEKS GESTATION: 17

## 2022-01-28 PROCEDURE — 99215 OFFICE O/P EST HI 40 MIN: CPT

## 2022-01-28 RX ORDER — KETOCONAZOLE 20 MG/G
2 CREAM TOPICAL
Qty: 60 | Refills: 0 | Status: DISCONTINUED | COMMUNITY
Start: 2021-11-03 | End: 2022-01-28

## 2022-01-28 RX ORDER — FLUTICASONE PROPIONATE 50 UG/1
50 SPRAY, METERED NASAL
Refills: 0 | Status: DISCONTINUED | COMMUNITY
End: 2022-01-28

## 2022-01-28 RX ORDER — ONDANSETRON 4 MG/1
4 TABLET, ORALLY DISINTEGRATING ORAL
Qty: 60 | Refills: 1 | Status: DISCONTINUED | COMMUNITY
Start: 2022-01-04 | End: 2022-01-28

## 2022-01-28 RX ORDER — MUPIROCIN CALCIUM 20 MG/G
2 OINTMENT TOPICAL
Refills: 0 | Status: DISCONTINUED | COMMUNITY
End: 2022-01-28

## 2022-01-28 RX ORDER — MOMETASONE FUROATE 1 MG/ML
0.1 SOLUTION TOPICAL
Refills: 0 | Status: DISCONTINUED | COMMUNITY
End: 2022-01-28

## 2022-01-28 NOTE — HISTORY OF PRESENT ILLNESS
[FreeTextEntry1] : Patient declined Croatian ,  will translate\par \par 25yo  at 17w0d GA, FRANCISCO 22 by LMP 10/1, c/w first trimester US, presenting for initial MFM consult for asthma. Referred by Dr. Estrada\par Currently denies abd cramping, vaginal bleeding, or leakage of fluid. Denies HA, vision changes, CP, SOB, urinary symptoms, or LE pain/swelling.\par \par H/o asthma, was diagnosed 10y ago. No h/o hospitalizations or intubations. Currently taking albuterol PRN, which she uses approximately 1x/week, used more frequently during summer months. Reports frequent nighttime awakenings almost daily, for the last month. Does not use albuterol inhaler when these episodes happen as she was unsure of safety in pregnancy. Was previously on singulair, which she has not been taking during pregnancy.\par H/o nasal polyps, had surgery x2.\par \par Extensive family history of cancer on maternal side, s/p genetic counselling, negative genetic test results related to hereditary cancer predisposition. \par

## 2022-01-28 NOTE — OB HISTORY
[___] : Total Pregnancies: [unfilled] [LMP: ___] : LMP: [unfilled] [FRANCISCO: ___] : FRANCISCO: [unfilled] [EGA: ___ wks] : EGA: [unfilled] wks

## 2022-01-28 NOTE — HISTORY OF PRESENT ILLNESS
[FreeTextEntry1] : Patient declined Welsh ,  will translate\par \par 27yo  at 17w0d GA, FRANCISCO 22 by LMP 10/1, c/w first trimester US, presenting for initial MFM consult for asthma. Referred by Dr. Estrada\par Currently denies abd cramping, vaginal bleeding, or leakage of fluid. Denies HA, vision changes, CP, SOB, urinary symptoms, or LE pain/swelling.\par \par H/o asthma, was diagnosed 10y ago. No h/o hospitalizations or intubations. Currently taking albuterol PRN, which she uses approximately 1x/week, used more frequently during summer months. Reports frequent nighttime awakenings almost daily, for the last month. Does not use albuterol inhaler when these episodes happen as she was unsure of safety in pregnancy. Was previously on singulair, which she has not been taking during pregnancy.\par H/o nasal polyps, had surgery x2.\par \par Extensive family history of cancer on maternal side, s/p genetic counselling, negative genetic test results related to hereditary cancer predisposition. \par

## 2022-01-28 NOTE — DISCUSSION/SUMMARY
[FreeTextEntry1] : Patient declined Bulgarian ,  will translate\par \par 25yo  at 17w0d GA, FRANCISCO 22 by LMP 10/1, c/w first trimester US, presenting for initial MFM consult for asthma. Referred by Dr. Estrada\par \par #asthma\par - In general, the effect of pregnancy on asthma is favorable with a majority of patients is either stable or improved. The effect of asthma on her fetus depends on the degree of her asthmatic control and oxygenation. Maternal hypoxia may be associated with fetal growth restriction and fetal hypoxia. \par - Approximately 30-40% of patients with asthma improve with pregnancy, unfortunately about 25% of patients can worsen. Patients requiring “rescue” therapy more than twice a week benefit from maintenance therapy with steroid inhalers. Fetal risks associated with under controlled asthma include IUGR, IUFD, and preeclampsia. The potential need for serial U/S and  testing was discussed. \par - can resume singulair, continue albuterol PRN\par - rx for peak flow meter sent to pharmacy, instructed on use. Nighttime awakenings could be due to asthma exacerbation vs nasal polyps\par - referral to pulmonology\par \par #pregnancy\par - bleeding precautions\par - PO hydration\par - prenatal vitamins\par - NIPT low risk, f/u [facility]SA[facility]\par - has anatomy US scheduled \par - continue routine prenatal care with Dr. Estrada\par \par RTC in 2 weeks\par Discussed with Dr. Simpson

## 2022-01-28 NOTE — DISCUSSION/SUMMARY
[FreeTextEntry1] : Patient declined Yi ,  will translate\par \par 27yo  at 17w0d GA, FRANCISCO 22 by LMP 10/1, c/w first trimester US, presenting for initial MFM consult for asthma. Referred by Dr. Estrada\par \par #asthma\par - In general, the effect of pregnancy on asthma is favorable with a majority of patients is either stable or improved. The effect of asthma on her fetus depends on the degree of her asthmatic control and oxygenation. Maternal hypoxia may be associated with fetal growth restriction and fetal hypoxia. \par - Approximately 30-40% of patients with asthma improve with pregnancy, unfortunately about 25% of patients can worsen. Patients requiring “rescue” therapy more than twice a week benefit from maintenance therapy with steroid inhalers. Fetal risks associated with under controlled asthma include IUGR, IUFD, and preeclampsia. The potential need for serial U/S and  testing was discussed. \par - can resume singulair, continue albuterol PRN\par - rx for peak flow meter sent to pharmacy, instructed on use. Nighttime awakenings could be due to asthma exacerbation vs nasal polyps\par - referral to pulmonology\par \par #pregnancy\par - bleeding precautions\par - PO hydration\par - prenatal vitamins\par - NIPT low risk, f/u [facility]\par - has anatomy US scheduled \par - continue routine prenatal care with Dr. Estrada\par \par RTC in 2 weeks\par Discussed with Dr. Simpson

## 2022-01-31 DIAGNOSIS — Z80.49 FAMILY HISTORY OF MALIGNANT NEOPLASM OF OTHER GENITAL ORGANS: ICD-10-CM

## 2022-01-31 DIAGNOSIS — Z80.0 FAMILY HISTORY OF MALIGNANT NEOPLASM OF DIGESTIVE ORGANS: ICD-10-CM

## 2022-01-31 DIAGNOSIS — J45.909 UNSPECIFIED ASTHMA, UNCOMPLICATED: ICD-10-CM

## 2022-01-31 DIAGNOSIS — O09.90 SUPERVISION OF HIGH RISK PREGNANCY, UNSPECIFIED, UNSPECIFIED TRIMESTER: ICD-10-CM

## 2022-01-31 DIAGNOSIS — Z87.09 PERSONAL HISTORY OF OTHER DISEASES OF THE RESPIRATORY SYSTEM: ICD-10-CM

## 2022-02-02 ENCOUNTER — NON-APPOINTMENT (OUTPATIENT)
Age: 27
End: 2022-02-02

## 2022-02-02 RX ORDER — DOXYLAMINE SUCCINATE AND PYRIDOXINE HYDROCHLORIDE 10; 10 MG/1; MG/1
10-10 TABLET, DELAYED RELEASE ORAL
Qty: 30 | Refills: 2 | Status: ACTIVE | COMMUNITY
Start: 2021-11-30 | End: 1900-01-01

## 2022-02-04 ENCOUNTER — APPOINTMENT (OUTPATIENT)
Dept: OBGYN | Facility: CLINIC | Age: 27
End: 2022-02-04
Payer: COMMERCIAL

## 2022-02-04 ENCOUNTER — RESULT CHARGE (OUTPATIENT)
Age: 27
End: 2022-02-04

## 2022-02-04 ENCOUNTER — OUTPATIENT (OUTPATIENT)
Dept: OUTPATIENT SERVICES | Facility: HOSPITAL | Age: 27
LOS: 1 days | Discharge: HOME | End: 2022-02-04

## 2022-02-04 VITALS
BODY MASS INDEX: 25.99 KG/M2 | WEIGHT: 156 LBS | HEIGHT: 65 IN | SYSTOLIC BLOOD PRESSURE: 111 MMHG | DIASTOLIC BLOOD PRESSURE: 67 MMHG

## 2022-02-04 DIAGNOSIS — Z34.92 ENCOUNTER FOR SUPERVISION OF NORMAL PREGNANCY, UNSPECIFIED, SECOND TRIMESTER: ICD-10-CM

## 2022-02-04 LAB
BILIRUB UR QL STRIP: NORMAL
CLARITY UR: CLEAR
COLLECTION METHOD: NORMAL
GLUCOSE UR-MCNC: NORMAL
HCG UR QL: 0.2 EU/DL
HGB UR QL STRIP.AUTO: NORMAL
KETONES UR-MCNC: NORMAL
LEUKOCYTE ESTERASE UR QL STRIP: NORMAL
NITRITE UR QL STRIP: NORMAL
PH UR STRIP: 6
PROT UR STRIP-MCNC: NORMAL
SP GR UR STRIP: 1.02

## 2022-02-04 PROCEDURE — 0502F SUBSEQUENT PRENATAL CARE: CPT

## 2022-02-04 PROCEDURE — 99213 OFFICE O/P EST LOW 20 MIN: CPT

## 2022-02-11 ENCOUNTER — APPOINTMENT (OUTPATIENT)
Dept: ANTEPARTUM | Facility: CLINIC | Age: 27
End: 2022-02-11

## 2022-02-11 NOTE — PHYSICAL EXAM
[FreeTextEntry1] : On physical exam, \par General: In no apparent distress. \par \par HEENT: Atraumatic. Extraocular muscles intact. \par \par Cardiovascular: Regular rate and rhythm, normal S1/S2 \par \par Pulmonary: Clear to auscultation bilaterally. No wheezing. \par \par Abdomen: soft, gravid, nontender, nondistended, no rebound, no guarding \par \par Extremities: no calf tenderness or edema \par \par Neurology: +2 reflexes in bilateral upper extremities \par \par Psychiatry: Happy mood. Awake, alert, oriented to person, place, time. \par  \par

## 2022-02-11 NOTE — DISCUSSION/SUMMARY
[FreeTextEntry1] : 25yo  at 19w0d GA, FRANCISCO 22 by LMP 10/1, c/w first trimester US, presenting for initial MFM consult for asthma. Referred by Dr. Estrada\par \par #asthma\par - In general, the effect of pregnancy on asthma is favorable with a majority of patients is either stable or improved. The effect of asthma on her fetus depends on the degree of her asthmatic control and oxygenation. Maternal hypoxia may be associated with fetal growth restriction and fetal hypoxia. \par - Approximately 30-40% of patients with asthma improve with pregnancy, unfortunately about 25% of patients can worsen. Patients requiring “rescue” therapy more than twice a week benefit from maintenance therapy with steroid inhalers. Fetal risks associated with under controlled asthma include IUGR, IUFD, and preeclampsia. The potential need for serial U/S and  testing was discussed. \par - can resume singulair, continue albuterol PRN\par - rx for peak flow meter sent to pharmacy, instructed on use. Nighttime awakenings could be due to asthma exacerbation vs nasal polyps\par - referral to pulmonology\par \par #pregnancy\par - bleeding precautions\par - PO hydration\par - prenatal vitamins\par - NIPT low risk, f/u MSAFP\par - has anatomy US scheduled \par - continue routine prenatal care with Dr. Estrada\par - f/u anatomy scan today\par RTC in 2 weeks\par Discussed with Dr. Bautista\par

## 2022-02-11 NOTE — HISTORY OF PRESENT ILLNESS
[FreeTextEntry1] : 25yo  at 19w0d GA, FRANCISCO 22 by LMP 10/1, c/w first trimester US, presenting for initial MFM consult for asthma. Referred by Dr. Estrada\par Currently denies abd cramping, vaginal bleeding, or leakage of fluid. Denies HA, vision changes, CP, SOB, urinary symptoms, or LE pain/swelling.\par \par H/o asthma, was diagnosed 10y ago. No h/o hospitalizations or intubations. Currently taking albuterol PRN, which she uses approximately 1x/week, used more frequently during summer months. Reports frequent nighttime awakenings almost daily, for the last month. Does not use albuterol inhaler when these episodes happen as she was unsure of safety in pregnancy. Was previously on singulair, which she has not been taking during pregnancy.\par H/o nasal polyps, had surgery x2.

## 2022-02-24 ENCOUNTER — ASOB RESULT (OUTPATIENT)
Age: 27
End: 2022-02-24

## 2022-02-24 ENCOUNTER — APPOINTMENT (OUTPATIENT)
Dept: ANTEPARTUM | Facility: CLINIC | Age: 27
End: 2022-02-24
Payer: COMMERCIAL

## 2022-02-24 ENCOUNTER — APPOINTMENT (OUTPATIENT)
Dept: OBGYN | Facility: CLINIC | Age: 27
End: 2022-02-24

## 2022-02-24 ENCOUNTER — OUTPATIENT (OUTPATIENT)
Dept: OUTPATIENT SERVICES | Facility: HOSPITAL | Age: 27
LOS: 1 days | Discharge: HOME | End: 2022-02-24

## 2022-02-24 DIAGNOSIS — O35.8XX0 MATERNAL CARE FOR OTHER (SUSPECTED) FETAL ABNORMALITY AND DAMAGE, NOT APPLICABLE OR UNSPECIFIED: ICD-10-CM

## 2022-02-24 PROCEDURE — 76811 OB US DETAILED SNGL FETUS: CPT | Mod: 26

## 2022-02-24 PROCEDURE — 76817 TRANSVAGINAL US OBSTETRIC: CPT | Mod: 26

## 2022-02-25 ENCOUNTER — APPOINTMENT (OUTPATIENT)
Dept: PEDIATRIC CARDIOLOGY | Facility: CLINIC | Age: 27
End: 2022-02-25
Payer: COMMERCIAL

## 2022-02-25 PROCEDURE — 99072 ADDL SUPL MATRL&STAF TM PHE: CPT

## 2022-02-25 PROCEDURE — 76825 ECHO EXAM OF FETAL HEART: CPT

## 2022-02-25 PROCEDURE — 93325 DOPPLER ECHO COLOR FLOW MAPG: CPT

## 2022-02-25 PROCEDURE — 76827 ECHO EXAM OF FETAL HEART: CPT

## 2022-02-25 PROCEDURE — 99203 OFFICE O/P NEW LOW 30 MIN: CPT

## 2022-02-25 NOTE — CARDIOLOGY SUMMARY
[Normal] : normal [Today's Date] : [unfilled] [FreeTextEntry2] : No evidence of VSD seen , Normal Fetal Echo

## 2022-02-25 NOTE — DISCUSSION/SUMMARY
[FreeTextEntry1] : Reassurance. \par D/W  and both parents in detail\par I spent about 35-40 minutues with the pt and the family face to face.\par

## 2022-03-02 LAB
2ND TRIMESTER DATA: NORMAL
ADDENDUM DOC: NORMAL
AFP PNL SERPL: NORMAL
AFP SERPL-ACNC: NORMAL
CLINICAL BIOCHEMIST REVIEW: NORMAL
NOTES NTD: NORMAL

## 2022-03-04 ENCOUNTER — APPOINTMENT (OUTPATIENT)
Dept: OBGYN | Facility: CLINIC | Age: 27
End: 2022-03-04
Payer: COMMERCIAL

## 2022-03-04 ENCOUNTER — RESULT CHARGE (OUTPATIENT)
Age: 27
End: 2022-03-04

## 2022-03-04 ENCOUNTER — OUTPATIENT (OUTPATIENT)
Dept: OUTPATIENT SERVICES | Facility: HOSPITAL | Age: 27
LOS: 1 days | Discharge: HOME | End: 2022-03-04

## 2022-03-04 VITALS
WEIGHT: 162 LBS | DIASTOLIC BLOOD PRESSURE: 64 MMHG | BODY MASS INDEX: 26.99 KG/M2 | HEIGHT: 65 IN | SYSTOLIC BLOOD PRESSURE: 122 MMHG

## 2022-03-04 DIAGNOSIS — Z34.92 ENCOUNTER FOR SUPERVISION OF NORMAL PREGNANCY, UNSPECIFIED, SECOND TRIMESTER: ICD-10-CM

## 2022-03-04 PROCEDURE — 99213 OFFICE O/P EST LOW 20 MIN: CPT

## 2022-03-23 LAB
BASOPHILS # BLD AUTO: 0.02 K/UL
BASOPHILS NFR BLD AUTO: 0.3 %
EOSINOPHIL # BLD AUTO: 0.21 K/UL
EOSINOPHIL NFR BLD AUTO: 3.4 %
GLUCOSE 1H P 50 G GLC PO SERPL-MCNC: 111 MG/DL
HCT VFR BLD CALC: 37.7 %
HGB BLD-MCNC: 12.2 G/DL
IMM GRANULOCYTES NFR BLD AUTO: 0.3 %
LYMPHOCYTES # BLD AUTO: 1.74 K/UL
LYMPHOCYTES NFR BLD AUTO: 28.5 %
MAN DIFF?: NORMAL
MCHC RBC-ENTMCNC: 32 PG
MCHC RBC-ENTMCNC: 32.4 G/DL
MCV RBC AUTO: 99 FL
MONOCYTES # BLD AUTO: 0.52 K/UL
MONOCYTES NFR BLD AUTO: 8.5 %
NEUTROPHILS # BLD AUTO: 3.6 K/UL
NEUTROPHILS NFR BLD AUTO: 59 %
PLATELET # BLD AUTO: 193 K/UL
RBC # BLD: 3.81 M/UL
RBC # FLD: 13.9 %
WBC # FLD AUTO: 6.11 K/UL

## 2022-03-30 ENCOUNTER — NON-APPOINTMENT (OUTPATIENT)
Age: 27
End: 2022-03-30

## 2022-04-07 ENCOUNTER — APPOINTMENT (OUTPATIENT)
Dept: ANTEPARTUM | Facility: CLINIC | Age: 27
End: 2022-04-07
Payer: COMMERCIAL

## 2022-04-07 ENCOUNTER — OUTPATIENT (OUTPATIENT)
Dept: OUTPATIENT SERVICES | Facility: HOSPITAL | Age: 27
LOS: 1 days | Discharge: HOME | End: 2022-04-07

## 2022-04-07 ENCOUNTER — ASOB RESULT (OUTPATIENT)
Age: 27
End: 2022-04-07

## 2022-04-07 ENCOUNTER — OUTPATIENT (OUTPATIENT)
Dept: OUTPATIENT SERVICES | Facility: HOSPITAL | Age: 27
LOS: 1 days | Discharge: HOME | End: 2022-04-07
Payer: COMMERCIAL

## 2022-04-07 ENCOUNTER — RESULT REVIEW (OUTPATIENT)
Age: 27
End: 2022-04-07

## 2022-04-07 ENCOUNTER — NON-APPOINTMENT (OUTPATIENT)
Age: 27
End: 2022-04-07

## 2022-04-07 VITALS — DIASTOLIC BLOOD PRESSURE: 69 MMHG | SYSTOLIC BLOOD PRESSURE: 108 MMHG | HEART RATE: 87 BPM

## 2022-04-07 VITALS — TEMPERATURE: 98 F | HEART RATE: 93 BPM | DIASTOLIC BLOOD PRESSURE: 81 MMHG | SYSTOLIC BLOOD PRESSURE: 135 MMHG

## 2022-04-07 DIAGNOSIS — Z98.890 OTHER SPECIFIED POSTPROCEDURAL STATES: Chronic | ICD-10-CM

## 2022-04-07 LAB
ALBUMIN SERPL ELPH-MCNC: 3.8 G/DL — SIGNIFICANT CHANGE UP (ref 3.5–5.2)
ALP SERPL-CCNC: 93 U/L — SIGNIFICANT CHANGE UP (ref 30–115)
ALT FLD-CCNC: 145 U/L — HIGH (ref 0–41)
ANION GAP SERPL CALC-SCNC: 13 MMOL/L — SIGNIFICANT CHANGE UP (ref 7–14)
APPEARANCE UR: ABNORMAL
AST SERPL-CCNC: 78 U/L — HIGH (ref 0–41)
BACTERIA # UR AUTO: NEGATIVE — SIGNIFICANT CHANGE UP
BASOPHILS # BLD AUTO: 0.02 K/UL — SIGNIFICANT CHANGE UP (ref 0–0.2)
BASOPHILS NFR BLD AUTO: 0.3 % — SIGNIFICANT CHANGE UP (ref 0–1)
BILIRUB SERPL-MCNC: <0.2 MG/DL — SIGNIFICANT CHANGE UP (ref 0.2–1.2)
BILIRUB UR-MCNC: NEGATIVE — SIGNIFICANT CHANGE UP
BUN SERPL-MCNC: 9 MG/DL — LOW (ref 10–20)
CALCIUM SERPL-MCNC: 9.4 MG/DL — SIGNIFICANT CHANGE UP (ref 8.5–10.1)
CHLORIDE SERPL-SCNC: 101 MMOL/L — SIGNIFICANT CHANGE UP (ref 98–110)
CO2 SERPL-SCNC: 19 MMOL/L — SIGNIFICANT CHANGE UP (ref 17–32)
COLOR SPEC: YELLOW — SIGNIFICANT CHANGE UP
CREAT SERPL-MCNC: <0.5 MG/DL — LOW (ref 0.7–1.5)
DIFF PNL FLD: ABNORMAL
EGFR: 141 ML/MIN/1.73M2 — SIGNIFICANT CHANGE UP
EOSINOPHIL # BLD AUTO: 0.12 K/UL — SIGNIFICANT CHANGE UP (ref 0–0.7)
EOSINOPHIL NFR BLD AUTO: 1.7 % — SIGNIFICANT CHANGE UP (ref 0–8)
EPI CELLS # UR: 18 /HPF — HIGH (ref 0–5)
GLUCOSE SERPL-MCNC: 79 MG/DL — SIGNIFICANT CHANGE UP (ref 70–99)
GLUCOSE UR QL: NEGATIVE — SIGNIFICANT CHANGE UP
HCT VFR BLD CALC: 38.9 % — SIGNIFICANT CHANGE UP (ref 37–47)
HGB BLD-MCNC: 13.2 G/DL — SIGNIFICANT CHANGE UP (ref 12–16)
HYALINE CASTS # UR AUTO: 12 /LPF — HIGH (ref 0–7)
IMM GRANULOCYTES NFR BLD AUTO: 0.6 % — HIGH (ref 0.1–0.3)
KETONES UR-MCNC: SIGNIFICANT CHANGE UP
LDH SERPL L TO P-CCNC: 441 — HIGH (ref 50–242)
LEUKOCYTE ESTERASE UR-ACNC: NEGATIVE — SIGNIFICANT CHANGE UP
LIDOCAIN IGE QN: 46 U/L — SIGNIFICANT CHANGE UP (ref 7–60)
LYMPHOCYTES # BLD AUTO: 2.16 K/UL — SIGNIFICANT CHANGE UP (ref 1.2–3.4)
LYMPHOCYTES # BLD AUTO: 30.7 % — SIGNIFICANT CHANGE UP (ref 20.5–51.1)
MAGNESIUM SERPL-MCNC: 1.8 MG/DL — SIGNIFICANT CHANGE UP (ref 1.8–2.4)
MCHC RBC-ENTMCNC: 32 PG — HIGH (ref 27–31)
MCHC RBC-ENTMCNC: 33.9 G/DL — SIGNIFICANT CHANGE UP (ref 32–37)
MCV RBC AUTO: 94.4 FL — SIGNIFICANT CHANGE UP (ref 81–99)
MONOCYTES # BLD AUTO: 0.66 K/UL — HIGH (ref 0.1–0.6)
MONOCYTES NFR BLD AUTO: 9.4 % — HIGH (ref 1.7–9.3)
NEUTROPHILS # BLD AUTO: 4.03 K/UL — SIGNIFICANT CHANGE UP (ref 1.4–6.5)
NEUTROPHILS NFR BLD AUTO: 57.3 % — SIGNIFICANT CHANGE UP (ref 42.2–75.2)
NITRITE UR-MCNC: NEGATIVE — SIGNIFICANT CHANGE UP
NRBC # BLD: 0 /100 WBCS — SIGNIFICANT CHANGE UP (ref 0–0)
PH UR: 6 — SIGNIFICANT CHANGE UP (ref 5–8)
PHOSPHATE SERPL-MCNC: 3.9 MG/DL — SIGNIFICANT CHANGE UP (ref 2.1–4.9)
PLATELET # BLD AUTO: 219 K/UL — SIGNIFICANT CHANGE UP (ref 130–400)
POTASSIUM SERPL-MCNC: 5.3 MMOL/L — HIGH (ref 3.5–5)
POTASSIUM SERPL-SCNC: 5.3 MMOL/L — HIGH (ref 3.5–5)
PROT SERPL-MCNC: 7 G/DL — SIGNIFICANT CHANGE UP (ref 6–8)
PROT UR-MCNC: SIGNIFICANT CHANGE UP
RBC # BLD: 4.12 M/UL — LOW (ref 4.2–5.4)
RBC # FLD: 13.4 % — SIGNIFICANT CHANGE UP (ref 11.5–14.5)
RBC CASTS # UR COMP ASSIST: 12 /HPF — HIGH (ref 0–4)
SODIUM SERPL-SCNC: 133 MMOL/L — LOW (ref 135–146)
SP GR SPEC: 1.03 — SIGNIFICANT CHANGE UP (ref 1.01–1.03)
URATE SERPL-MCNC: 3.8 MG/DL — SIGNIFICANT CHANGE UP (ref 2.5–7)
UROBILINOGEN FLD QL: SIGNIFICANT CHANGE UP
WBC # BLD: 7.03 K/UL — SIGNIFICANT CHANGE UP (ref 4.8–10.8)
WBC # FLD AUTO: 7.03 K/UL — SIGNIFICANT CHANGE UP (ref 4.8–10.8)
WBC UR QL: 5 /HPF — SIGNIFICANT CHANGE UP (ref 0–5)

## 2022-04-07 PROCEDURE — 99214 OFFICE O/P EST MOD 30 MIN: CPT | Mod: 25

## 2022-04-07 PROCEDURE — ZZZZZ: CPT

## 2022-04-07 PROCEDURE — 99204 OFFICE O/P NEW MOD 45 MIN: CPT

## 2022-04-07 PROCEDURE — 76705 ECHO EXAM OF ABDOMEN: CPT | Mod: 26

## 2022-04-07 PROCEDURE — 93010 ELECTROCARDIOGRAM REPORT: CPT

## 2022-04-07 PROCEDURE — 76819 FETAL BIOPHYS PROFIL W/O NST: CPT | Mod: 26

## 2022-04-07 PROCEDURE — 59025 FETAL NON-STRESS TEST: CPT | Mod: 26

## 2022-04-07 PROCEDURE — 76815 OB US LIMITED FETUS(S): CPT | Mod: 26

## 2022-04-07 RX ORDER — FAMOTIDINE 10 MG/ML
20 INJECTION INTRAVENOUS ONCE
Refills: 0 | Status: COMPLETED | OUTPATIENT
Start: 2022-04-07 | End: 2022-04-07

## 2022-04-07 RX ADMIN — FAMOTIDINE 20 MILLIGRAM(S): 10 INJECTION INTRAVENOUS at 12:48

## 2022-04-07 NOTE — CONSULT NOTE ADULT - SUBJECTIVE AND OBJECTIVE BOX
BRIANNA LEMONS [id number]  26y Female    Surgical consult: 26F  26w6d PHM of asthma (x1 hospitalization in the past) presenting with 1 month history of epigastric abdominal pain with associated nausea, no vomiting. This is her first episode of the pain, not worse with eating. No F/chills. Patient states that she has never had endoscopy, however endorses that she sometimes wakes up with a sore throat and occasional reflux symptoms. Denies contractions, leakage of fluid, vaginal bleeding.     Last PO intake: 830  Last BM: 2 days ago    PAST MEDICAL & SURGICAL HISTORY:  Asthma  History of nasal polypectomy    MEDICATIONS  (STANDING):    MEDICATIONS  (PRN):    Allergies  No Known Allergies  Intolerances    REVIEW OF SYSTEMS  [x ] A ten-point review of systems was otherwise negative except as noted.  [ ] Due to altered mental status/intubation, subjective information were not able to be obtained from the patient. History was obtained, to the extent possible, from review of the chart and collateral sources of information.    Vital Signs Last 24 Hrs  T(C): 36.7 (2022 11:06), Max: 36.7 (2022 10:58)  T(F): 98.1 (2022 11:06), Max: 98.1 (2022 10:58)  HR: 87 (2022 13:52) (82 - 93)  BP: 108/69 (2022 13:52) (107/61 - 135/81)  RR: 18 (2022 11:06) (18 - 18)    PHYSICAL EXAM:  GENERAL: NAD, well-appearing  CHEST/LUNG: Clear to auscultation bilaterally  HEART: Regular rate and rhythm  ABDOMEN: Soft, appropriate fundal height, minimally tender epigastric region, Nondistended; (-) Rene's sign, no rebound or guarding    Labs:  CAPILLARY BLOOD GLUCOSE                      13.2   7.03  )-----------( 219      ( 2022 12:47 )             38.9       Auto Neutrophil %: 57.3 % (22 @ 12:47)  Auto Immature Granulocyte %: 0.6 % (22 @ 12:47)        133<L>  |  101  |  9<L>  ----------------------------<  79  5.3<H>   |  19  |  <0.5<L>      Calcium, Total Serum: 9.4 mg/dL (22 @ 12:47)      LFTs:             7.0  | <0.2 | 78       ------------------[93      ( 2022 12:47 )  3.8  | x    | 145         Lipase:46     Amylase:x        Coags:    Urinalysis Basic - ( 2022 12:49 )    Color: Yellow / Appearance: Slightly Turbid / S.029 / pH: x  Gluc: x / Ketone: Trace  / Bili: Negative / Urobili: <2 mg/dL   Blood: x / Protein: Trace / Nitrite: Negative   Leuk Esterase: Negative / RBC: 12 /HPF / WBC 5 /HPF   Sq Epi: x / Non Sq Epi: 18 /HPF / Bacteria: Negative    RADIOLOGY & ADDITIONAL STUDIES:  < from: US Abdomen Upper Quadrant Right (22 @ 14:36) >  IMPRESSION:  Cholelithiasis with sludge. No evidence of cholecystitis.  < end of copied text >

## 2022-04-07 NOTE — OB PROVIDER TRIAGE NOTE - NSOBPROVIDERNOTE_OBGYN_ALL_OB_FT
26 y  @ 26w6d, GBS unknown, h/o asthma, with epigastric pain r/o cholelithiasis, r/o GERD, with contractions r/o PTL, BPP 8/8 with reassuring maternal and fetal status  -continuous efm/toco monitoring  -monitor vitals  -IVF hydration  -reevaluate SVE in 2 hours  -f/u labs (CBC, CMP, lipase)  -f/u RUQ sono  -f/u EKG  -protonix IVP  -expectant management    Dr Kevin and Dr Abraham aware 26 y  @ 26w6d, GBS unknown, h/o asthma, with epigastric pain r/o cholelithiasis, r/o GERD, with contractions r/o PTL, BPP 8/8 with reassuring maternal and fetal status  -continuous efm/toco monitoring  -monitor vitals  -IVF hydration  -reevaluate SVE in 2 hours  -f/u labs (CBC, CMP, LDH, uric acid, mg, phos, lipase)  -f/u RUQ sono  -f/u EKG  -protonix IVP  -expectant management    Dr Kevin and Dr Abraham aware 26 y  @ 26w6d, GBS unknown, h/o asthma, with epigastric pain r/o cholelithiasis, r/o GERD, with contractions r/o PTL, BPP 8/8 with reassuring maternal and fetal status  -continuous efm/toco monitoring  -monitor vitals  -IVF hydration  -reevaluate SVE in 2 hours  -f/u labs (CBC, CMP, LDH, uric acid, mg, phos, lipase)  -f/u RUQ sono  -f/u EKG  -pepcid IVP  -expectant management    Dr Kevin and Dr Abraham aware 26 y  @ 26w6d, GBS unknown, h/o asthma, with epigastric pain r/o cholelithiasis, r/o GERD, with contractions r/o PTL, BPP /8 with reassuring maternal and fetal status  -continuous efm/toco monitoring  -monitor vitals  -IVF hydration  -reevaluate SVE in 2 hours  -f/u labs (CBC, CMP, LDH, uric acid, mg, phos, lipase)  -f/u RUQ sono  -f/u EKG  -pepcid IVP  -expectant management    Dr Kevin and Dr Abraham aware    Addendum: On reevaluation, patient reports her pain is now improved 5/10 pain. Patient is no longer contraction. Per surgery, patient to follow postpartum for discussion of cholecystectomy. Patient does not desire pain medication at this time. Reassuring maternal and fetal status at this time. Elevated LFTs so will watch the trend outpatient. Patient encouraged to avoid fatty, greasy foods. PO maternal hydration encouraged. [facility] instructions given. PTL precautions reviewed.     Dr Abraham and Dr Kevin aware 26 y  @ 26w6d, GBS unknown, h/o asthma, with epigastric pain r/o cholelithiasis, r/o GERD, with contractions r/o PTL, BPP 8/8 with reassuring maternal and fetal status  -continuous efm/toco monitoring  -monitor vitals  -IVF hydration  -reevaluate SVE in 2 hours  -f/u labs (CBC, CMP, LDH, uric acid, mg, phos, lipase)  -f/u RUQ sono  -f/u EKG  -pepcid IVP  -expectant management    Dr Kevin and Dr Abraham aware    Addendum: On reevaluation, patient reports her pain is now improved 5/10 pain. Patient is no longer contraction. Per surgery, patient to follow postpartum for discussion of cholecystectomy. Patient does not desire pain medication at this time. Reassuring maternal and fetal status at this time. Elevated LFTs so will watch the trend outpatient. Patient encouraged to avoid fatty, greasy foods. PO maternal hydration encouraged. [facility]KC instructions given. PTL precautions reviewed.     Dr Abraham and Dr Kevin aware    I was physically present for the key portions of the evaluation and management (e/m) service provided. I agree with the above history,physical and plan which I have reviewed and edited where appropriate  Patient seen face to face and case reviewed, precautions given to patient  nst reassuring  sonogram mvp 4.53cm

## 2022-04-07 NOTE — OB PROVIDER TRIAGE NOTE - NSHPPHYSICALEXAM_GEN_ALL_CORE
Physical exam:    Vital Signs Last 24 Hrs  T(F): 98.1 (07 Apr 2022 11:06), Max: 98.1 (07 Apr 2022 10:58)  HR: 93 (07 Apr 2022 11:06) (93 - 93)  BP: 135/81 (07 Apr 2022 11:06) (135/81 - 135/81)  RR: 18 (07 Apr 2022 11:06) (18 - 18)    Gen: NAD, AAOx3  CV: S1S2, RRR, no M/R/G  Pulm: CTAB  Ext: no calf tenderness or edema  Abdomen: Soft, gravid, mild epigastric tenderness with palpation, with nonpalpable contractions. Negative Rene's sign.     EFM: 150/mod/pos acc  toco: irregular  Speculum: Cervix appeared closed, no bleeding, no lesions, no lacerations, no pooling.  Sonogram: CL 3.51cm, vertex, anterior placenta, MVP 4.53cm, BPP 8/8  SVE: 0/0/-3, vtx, intact

## 2022-04-07 NOTE — OB RN TRIAGE NOTE - FALL HARM RISK - UNIVERSAL INTERVENTIONS
Bed in lowest position, wheels locked, appropriate side rails in place/Call bell, personal items and telephone in reach/Instruct patient to call for assistance before getting out of bed or chair/Non-slip footwear when patient is out of bed/[place] to call system/Physically safe environment - no spills, clutter or unnecessary equipment/Purposeful Proactive Rounding/Room/bathroom lighting operational, light cord in reach

## 2022-04-07 NOTE — CONSULT NOTE ADULT - ASSESSMENT
Assessment  26F  26w6d PHM of asthma (x1 hospitalization in the past) presenting with 1 month history of epigastric abdominal pain with associated nausea, RUQ U/S showing cholelithiasis without cholecystitis    Plan  - no acute surgical intervention at this time  - OP f/u after pregnancy with Dr. Abbott for elective laparoscopic cholecystectomy  - d/w Dr. Abbott

## 2022-04-07 NOTE — OB PROVIDER TRIAGE NOTE - ADDITIONAL INSTRUCTIONS
Please return to hospital if you experience decreased fetal movement, heavy vaginal bleeding, frequent and persistent contractions, or leakage of fluid. Please keep your appointment with your doctor. Avoid fatty, greasy foods. Drink plenty of water. Follow with surgery 6 weeks postpartum to plan for possible cholecystectomy.

## 2022-04-07 NOTE — OB PROVIDER TRIAGE NOTE - HISTORY OF PRESENT ILLNESS
26 y  @ 26w6d, FRANCISCO 22 dated by LMP c/w first trimester rochelle, presents to L&D complaining of persistent epigastric tenderness for the last month that has progressively gotten worse, now 7/10 in intensity and burning in quality. She reports laying down makes the pain better and sitting up for prolonged periods of time makes the pain worse. Reports the pain is not associated with eating. Patient reports an episode of nausea yesterday that resolved with Diclegis which she has been taking intermittently for nausea and vomiting this pregnancy. She denies recent vomiting, fevers, chills, diarrhea. Reports intermittent constipation throughout the pregnancy which she takes fiber supplements. Patient reports she occasionally has reflux and takes tums as needed. Denies contractions, leakage of fluid, vaginal bleeding. Reports good fetal movement. GBS unknown.     Pregnancy complicated by:  -History of asthma, denies intubations. Reports she has had visits to the ED for panic attacks associated with SOB requiring supplemental oxygen. Her last episode was 1.5 years ago. She uses albuterol prn, usually uses it 1x each week. Also uses zyrtec. Reports occasional night time SOB for which she uses pillows and her fan to alleviate.   -MFM recommendation from today's visit: Add fluticasone 220mcg one puff twice daily or Arnuity Elipta once daily (less expensive generic). Add omeprazole 20mg BID.  26 y  @ 26w6d, FRANCISCO 22 dated by LMP c/w first trimester rochelle, presents to L&D complaining of persistent epigastric tenderness for the last month that has progressively gotten worse, now 7/10 in intensity and burning in quality. She reports laying down makes the pain better and sitting up for prolonged periods of time makes the pain worse. Reports the pain is not associated with eating. Patient reports an episode of nausea yesterday that resolved with Diclegis which she has been taking intermittently for nausea and vomiting this pregnancy. She denies recent vomiting, fevers, chills, diarrhea. Reports intermittent constipation throughout the pregnancy which she takes fiber supplements. Patient reports she occasionally has reflux and takes tums as needed. Denies contractions, leakage of fluid, vaginal bleeding. Reports good fetal movement. GBS unknown.     Pregnancy complicated by:  -History of asthma, denies intubations. Reports she has had visits to the ED for panic attacks associated with SOB requiring supplemental oxygen. Her last episode was 1.5 years ago. She uses albuterol prn, usually uses it 1x each week. Also uses zyrtec. Reports occasional night time SOB for which she uses pillows and her fan to alleviate.   -MFM recommendation from today's visit: Add fluticasone 220mcg one puff twice daily or Arnuity Elipta once daily (less expensive generic). Add omeprazole 20mg BID.     Last PO intake: 830  Last BM: 2 days ago  Last intercourse: 3 days ago

## 2022-04-07 NOTE — OB RN PATIENT PROFILE - FALL HARM RISK - UNIVERSAL INTERVENTIONS
Bed in lowest position, wheels locked, appropriate side rails in place/Call bell, personal items and telephone in reach/Instruct patient to call for assistance before getting out of bed or chair/Non-slip footwear when patient is out of bed/[place] to call system/Physically safe environment - no spills, clutter or unnecessary equipment/Purposeful Proactive Rounding/Room/bathroom lighting operational, light cord in reach

## 2022-04-07 NOTE — CONSULT NOTE ADULT - ATTENDING COMMENTS
ACS Attending Note Attestation    Patient is examined and evaluated at the bedside with the residents/PAs. Treatment plan discussed with the team, nurses, and consulting physicians and consulting teams. Medications, radiological studies and all other relevant studies reviewed. I reviewed the resident/PA note and agreed with above assessment and plan with following additions and corrections.    BRIANNA LEMONS Patient is a 26y old  Female who presents with a chief complaint of epigastric abdominal pain, reflex. patient was found to have gall stones as well. No physical exam consistent with cholecystitis. Patient in third trimester of pregnancy.    Allergies  No Known Allergies  Intolerances    PAST MEDICAL & SURGICAL HISTORY:  Asthma  History of nasal polypectomy      Vital Signs Last 24 Hrs  T(C): 36.7 (07 Apr 2022 11:06), Max: 36.7 (07 Apr 2022 10:58)  T(F): 98.1 (07 Apr 2022 11:06), Max: 98.1 (07 Apr 2022 10:58)  HR: 87 (07 Apr 2022 13:52) (82 - 93)  BP: 108/69 (07 Apr 2022 13:52) (107/61 - 135/81)  BP(mean): --  RR: 18 (07 Apr 2022 11:06) (18 - 18)  SpO2: --                        13.2   7.03  )-----------( 219      ( 07 Apr 2022 12:47 )             38.9     04-07    133<L>  |  101  |  9<L>  ----------------------------<  79  5.3<H>   |  19  |  <0.5<L>    Ca    9.4      07 Apr 2022 12:47  Phos  3.9     04-07  Mg     1.8     04-07    TPro  7.0  /  Alb  3.8  /  TBili  <0.2  /  DBili  x   /  AST  78<H>  /  ALT  145<H>  /  AlkPhos  93  04-07      Diagnosis: Asymptomatic cholelithiasis                                GERD  Plan:	  - out patient follow up post partum  - supportive care  - GI/DVT prophylaxis  - pain management  - follow up consults  - repeat studies as needed    Allie Abbott MD, FACS  Trauma/ACS/Surcical Critical care Attending

## 2022-04-07 NOTE — OB PROVIDER TRIAGE NOTE - NSHPLABSRESULTS_GEN_ALL_CORE
Sonograms:   11w5d: left lateral placenta, FH+, CRL 54.03mm, 0.97mm (17%)  20w6d: vertex, anterior no previa, MVP 4.58cm, 388gm, possible 3mm intramuscular VSD noted  26w6d: vertex, anterior no previa, MVP 5.28cm, 982gm (35%), anatomy wnl  2/25/22 normal fetal echom no evidence of VSD seen  3/26/22 normal fetal echo    Prenatal labs  Type and Screen: B neg  Antibody screen: neg   Rubella: immune  Varicella: immune   RPR: neg  HbSAg: neg  HIV: NR  Gonorrhea: neg   Chlamydia: neg    Second Trimester:  1 hour Glucola: 111    NIPTs LR

## 2022-04-07 NOTE — CHART NOTE - NSCHARTNOTEFT_GEN_A_CORE
PGY 1    At patient bedside for evaluation. SVE: 0/0/-3 @1530, vtx, intact (unchanged from 1230 exam). Patient reports her pain is now 5/10 after Pepcid. RUQ sonogram showed cholelithiasis with sludge, CMP showed transaminitis of AST/ALT 78/145.     -surgery consult ordered and called  -NPO, IVF  -continuous efm/toco monitoring  -monitor vitals  -expectant management    Dr Kevin and Dr Abraham aware

## 2022-04-07 NOTE — DISCUSSION/SUMMARY
[FreeTextEntry1] : [facility] Att'g Ultrasound Consult Note:\par   Hx N&V of Pregnancy not requiring admission - required several different meds.\par   Blood type:  neg/AntibNeg\par   Asthma\par   \par I evaluated Ms Harrison in the [facility] today where she presented for completion of the anatomic survey. She was offered and declined Nepali .  Her  translated.\par 27yo  at 26w6d, walking without difficulty, but holds RUQ/flank area with her R arm.  During US exam, c/o persistent epigastric pain/tenderness and nausea, vomiting only once recently.  Appears to be in significant pain.  Denies hearburn, acid reflux. No VB, LOF, Contractions, LAP. +AFM.  Round ligament pain from earier this pregnancy appears to have resolved. \par PMHx:   Hx N&V of Pregnancy not requiring admission - required several different meds.\par                Sycopal episode x1 in 1st tri only\par               Blood type:  neg/AntibNeg\par               Asthma:  Albuterol inhaler once weekly.   D/C'd singulair during pregnancy. Previously c/w nighttime wheezing. \par Px: VS not done in US unit.\par HEENT:  NC/AT\par Lungs: No CVAT.  Not auscultated. \par Abd: POS LUQ tenderness with mild rebound, no guarding.  Gravid fundus soft and NT. \par Extr:   No CCE\par \par LAB:  : Bneg/AntibNeg  HbEP AA.\par  NIPT normal; MSAFP: 0.51 MoM; normal; oNTD risk 1 in >250,000. \par 3/23/22: GCT: 111\par \par [facility] OBUS Summary: \par 21: NT Normal at 1.0mm.  CRL c/w LMP.\par 22: Normal growth.  Anatomy incomplete.  ?3mm VSD.\par 22: Normal fetal ECHO\par : SFVtx at 26w6d.  EFW at 35%ile. Anatomic survey complete, except for spine which is not well imaged due to persistent back down position.  Among images obtained, no abnormalities are detected.  MSAFP 0.51 MoM is noted. RT 4w for growth and check spine. Pt counseled. \par \par Impression/Plan: discussed with Dr Barber and L&D team:\par 1. Epigastric pain with nausea: r/o cholecystitis and other acute process.  To L&D for GB US and labs.\par 2. Asthma: \par -->Add inhaled corticosteroids to regimen.  Fluticasone 220mcg one puff twice daily, or Arnuity Elipta once daily (less expensive generic).\par -->Add PPI to regimen for likely GERD associated night time wheezing. May also relieve epigastric pain.  Omeprazole 20mg bid. \par -->Encourage use of peak flow meter daily.  record n log. \par 3. Rh Neg: Rhogam at 28w as planned.\par 4. Incomplete anatomic survey for spine:\par -->Follow up US for growth and views of spine in 4w (ladonna't scheduled).\par 5. Follow up MFM in two weeks (not yet scheduled) for asthma and epigastric pain.\par 6. Follow up with Dr Estrada next week as scheduled.\par \par MD Simone, FACOG

## 2022-04-08 LAB
CULTURE RESULTS: SIGNIFICANT CHANGE UP
SPECIMEN SOURCE: SIGNIFICANT CHANGE UP

## 2022-04-09 ENCOUNTER — NON-APPOINTMENT (OUTPATIENT)
Age: 27
End: 2022-04-09

## 2022-04-12 ENCOUNTER — NON-APPOINTMENT (OUTPATIENT)
Age: 27
End: 2022-04-12

## 2022-04-12 ENCOUNTER — APPOINTMENT (OUTPATIENT)
Dept: OBGYN | Facility: CLINIC | Age: 27
End: 2022-04-12
Payer: COMMERCIAL

## 2022-04-12 ENCOUNTER — OUTPATIENT (OUTPATIENT)
Dept: OUTPATIENT SERVICES | Facility: HOSPITAL | Age: 27
LOS: 1 days | Discharge: HOME | End: 2022-04-12

## 2022-04-12 VITALS
WEIGHT: 173 LBS | DIASTOLIC BLOOD PRESSURE: 74 MMHG | HEIGHT: 65 IN | SYSTOLIC BLOOD PRESSURE: 122 MMHG | BODY MASS INDEX: 28.82 KG/M2

## 2022-04-12 DIAGNOSIS — Z34.92 ENCOUNTER FOR SUPERVISION OF NORMAL PREGNANCY, UNSPECIFIED, SECOND TRIMESTER: ICD-10-CM

## 2022-04-12 DIAGNOSIS — Z3A.26 26 WEEKS GESTATION OF PREGNANCY: ICD-10-CM

## 2022-04-12 DIAGNOSIS — Z34.01 ENCOUNTER FOR SUPERVISION OF NORMAL FIRST PREGNANCY, FIRST TRIMESTER: ICD-10-CM

## 2022-04-12 DIAGNOSIS — Z98.890 OTHER SPECIFIED POSTPROCEDURAL STATES: Chronic | ICD-10-CM

## 2022-04-12 DIAGNOSIS — O35.9XX0 MATERNAL CARE FOR (SUSPECTED) FETAL ABNORMALITY AND DAMAGE, UNSPECIFIED, NOT APPLICABLE OR UNSPECIFIED: ICD-10-CM

## 2022-04-12 DIAGNOSIS — O26.899 OTHER SPECIFIED PREGNANCY RELATED CONDITIONS, UNSPECIFIED TRIMESTER: ICD-10-CM

## 2022-04-12 PROCEDURE — 99213 OFFICE O/P EST LOW 20 MIN: CPT

## 2022-04-12 RX ORDER — MONTELUKAST 10 MG/1
10 TABLET, FILM COATED ORAL DAILY
Qty: 30 | Refills: 1 | Status: ACTIVE | COMMUNITY
Start: 2021-08-23 | End: 1900-01-01

## 2022-04-27 LAB
ABO + RH PNL BLD: NORMAL
BLD GP AB SCN SERPL QL: NORMAL

## 2022-04-28 ENCOUNTER — NON-APPOINTMENT (OUTPATIENT)
Age: 27
End: 2022-04-28

## 2022-04-29 ENCOUNTER — RESULT CHARGE (OUTPATIENT)
Age: 27
End: 2022-04-29

## 2022-04-29 ENCOUNTER — OUTPATIENT (OUTPATIENT)
Dept: OUTPATIENT SERVICES | Facility: HOSPITAL | Age: 27
LOS: 1 days | Discharge: HOME | End: 2022-04-29

## 2022-04-29 ENCOUNTER — APPOINTMENT (OUTPATIENT)
Dept: OBGYN | Facility: CLINIC | Age: 27
End: 2022-04-29
Payer: COMMERCIAL

## 2022-04-29 VITALS
WEIGHT: 177.06 LBS | HEIGHT: 65 IN | BODY MASS INDEX: 29.5 KG/M2 | DIASTOLIC BLOOD PRESSURE: 70 MMHG | SYSTOLIC BLOOD PRESSURE: 114 MMHG

## 2022-04-29 DIAGNOSIS — Z30.431 ENCOUNTER FOR ROUTINE CHECKING OF INTRAUTERINE CONTRACEPTIVE DEVICE: ICD-10-CM

## 2022-04-29 DIAGNOSIS — Z34.92 ENCOUNTER FOR SUPERVISION OF NORMAL PREGNANCY, UNSPECIFIED, SECOND TRIMESTER: ICD-10-CM

## 2022-04-29 DIAGNOSIS — Z98.890 OTHER SPECIFIED POSTPROCEDURAL STATES: Chronic | ICD-10-CM

## 2022-04-29 DIAGNOSIS — Z23 ENCOUNTER FOR IMMUNIZATION: ICD-10-CM

## 2022-04-29 PROCEDURE — 99213 OFFICE O/P EST LOW 20 MIN: CPT

## 2022-05-02 LAB
BILIRUB UR QL STRIP: NORMAL
CLARITY UR: CLEAR
COLLECTION METHOD: NORMAL
GLUCOSE UR-MCNC: NORMAL
HCG UR QL: 0.2 EU/DL
HGB UR QL STRIP.AUTO: NORMAL
KETONES UR-MCNC: NORMAL
LEUKOCYTE ESTERASE UR QL STRIP: NORMAL
NITRITE UR QL STRIP: NORMAL
PH UR STRIP: 6.5
PROT UR STRIP-MCNC: NORMAL
SP GR UR STRIP: 1.01

## 2022-05-06 ENCOUNTER — ASOB RESULT (OUTPATIENT)
Age: 27
End: 2022-05-06

## 2022-05-06 ENCOUNTER — APPOINTMENT (OUTPATIENT)
Dept: ANTEPARTUM | Facility: CLINIC | Age: 27
End: 2022-05-06
Payer: COMMERCIAL

## 2022-05-06 ENCOUNTER — OUTPATIENT (OUTPATIENT)
Dept: OUTPATIENT SERVICES | Facility: HOSPITAL | Age: 27
LOS: 1 days | Discharge: HOME | End: 2022-05-06

## 2022-05-06 DIAGNOSIS — J45.909 UNSPECIFIED ASTHMA, UNCOMPLICATED: ICD-10-CM

## 2022-05-06 DIAGNOSIS — Z3A.31 31 WEEKS GESTATION OF PREGNANCY: ICD-10-CM

## 2022-05-06 DIAGNOSIS — Z98.890 OTHER SPECIFIED POSTPROCEDURAL STATES: Chronic | ICD-10-CM

## 2022-05-06 DIAGNOSIS — O09.90 SUPERVISION OF HIGH RISK PREGNANCY, UNSPECIFIED, UNSPECIFIED TRIMESTER: ICD-10-CM

## 2022-05-06 DIAGNOSIS — Z36.89 ENCOUNTER FOR OTHER SPECIFIED ANTENATAL SCREENING: ICD-10-CM

## 2022-05-06 DIAGNOSIS — O26.849 UTERINE SIZE-DATE DISCREPANCY, UNSPECIFIED TRIMESTER: ICD-10-CM

## 2022-05-06 PROCEDURE — 76816 OB US FOLLOW-UP PER FETUS: CPT | Mod: 26

## 2022-05-13 ENCOUNTER — APPOINTMENT (OUTPATIENT)
Dept: OBGYN | Facility: CLINIC | Age: 27
End: 2022-05-13
Payer: COMMERCIAL

## 2022-05-13 ENCOUNTER — OUTPATIENT (OUTPATIENT)
Dept: OUTPATIENT SERVICES | Facility: HOSPITAL | Age: 27
LOS: 1 days | Discharge: HOME | End: 2022-05-13

## 2022-05-13 ENCOUNTER — RESULT CHARGE (OUTPATIENT)
Age: 27
End: 2022-05-13

## 2022-05-13 VITALS
BODY MASS INDEX: 29.49 KG/M2 | HEIGHT: 65 IN | SYSTOLIC BLOOD PRESSURE: 110 MMHG | DIASTOLIC BLOOD PRESSURE: 70 MMHG | WEIGHT: 177 LBS

## 2022-05-13 DIAGNOSIS — Z98.890 OTHER SPECIFIED POSTPROCEDURAL STATES: Chronic | ICD-10-CM

## 2022-05-13 PROCEDURE — 99213 OFFICE O/P EST LOW 20 MIN: CPT

## 2022-05-18 LAB
BASOPHILS # BLD AUTO: 0.02 K/UL
BASOPHILS NFR BLD AUTO: 0.3 %
BILIRUB UR QL STRIP: NORMAL
CLARITY UR: CLEAR
COLLECTION METHOD: NORMAL
EOSINOPHIL # BLD AUTO: 0.14 K/UL
EOSINOPHIL NFR BLD AUTO: 2.4 %
GLUCOSE UR-MCNC: NORMAL
HCG UR QL: 0.2 EU/DL
HCT VFR BLD CALC: 39 %
HGB BLD-MCNC: 12.7 G/DL
HGB UR QL STRIP.AUTO: NORMAL
HIV1+2 AB SPEC QL IA.RAPID: NONREACTIVE
IMM GRANULOCYTES NFR BLD AUTO: 0.7 %
KETONES UR-MCNC: NORMAL
LEUKOCYTE ESTERASE UR QL STRIP: NORMAL
LYMPHOCYTES # BLD AUTO: 1.95 K/UL
LYMPHOCYTES NFR BLD AUTO: 33.2 %
MAN DIFF?: NORMAL
MCHC RBC-ENTMCNC: 31.6 PG
MCHC RBC-ENTMCNC: 32.6 G/DL
MCV RBC AUTO: 97 FL
MONOCYTES # BLD AUTO: 0.66 K/UL
MONOCYTES NFR BLD AUTO: 11.2 %
NEUTROPHILS # BLD AUTO: 3.06 K/UL
NEUTROPHILS NFR BLD AUTO: 52.2 %
NITRITE UR QL STRIP: NORMAL
PH UR STRIP: 6
PLATELET # BLD AUTO: 178 K/UL
PROT UR STRIP-MCNC: NORMAL
RBC # BLD: 4.02 M/UL
RBC # FLD: 13.2 %
SP GR UR STRIP: 1030
T PALLIDUM AB SER QL IA: NEGATIVE
WBC # FLD AUTO: 5.87 K/UL

## 2022-05-31 ENCOUNTER — RESULT CHARGE (OUTPATIENT)
Age: 27
End: 2022-05-31

## 2022-05-31 ENCOUNTER — OUTPATIENT (OUTPATIENT)
Dept: OUTPATIENT SERVICES | Facility: HOSPITAL | Age: 27
LOS: 1 days | Discharge: HOME | End: 2022-05-31

## 2022-05-31 ENCOUNTER — APPOINTMENT (OUTPATIENT)
Dept: OBGYN | Facility: CLINIC | Age: 27
End: 2022-05-31
Payer: COMMERCIAL

## 2022-05-31 VITALS
SYSTOLIC BLOOD PRESSURE: 96 MMHG | BODY MASS INDEX: 31.25 KG/M2 | DIASTOLIC BLOOD PRESSURE: 60 MMHG | HEIGHT: 65 IN | WEIGHT: 187.56 LBS

## 2022-05-31 DIAGNOSIS — Z98.890 OTHER SPECIFIED POSTPROCEDURAL STATES: Chronic | ICD-10-CM

## 2022-05-31 PROCEDURE — 99213 OFFICE O/P EST LOW 20 MIN: CPT

## 2022-06-03 ENCOUNTER — ASOB RESULT (OUTPATIENT)
Age: 27
End: 2022-06-03

## 2022-06-03 ENCOUNTER — APPOINTMENT (OUTPATIENT)
Dept: ANTEPARTUM | Facility: CLINIC | Age: 27
End: 2022-06-03
Payer: COMMERCIAL

## 2022-06-03 ENCOUNTER — OUTPATIENT (OUTPATIENT)
Dept: OUTPATIENT SERVICES | Facility: HOSPITAL | Age: 27
LOS: 1 days | Discharge: HOME | End: 2022-06-03

## 2022-06-03 DIAGNOSIS — O09.90 SUPERVISION OF HIGH RISK PREGNANCY, UNSPECIFIED, UNSPECIFIED TRIMESTER: ICD-10-CM

## 2022-06-03 DIAGNOSIS — Z98.890 OTHER SPECIFIED POSTPROCEDURAL STATES: Chronic | ICD-10-CM

## 2022-06-03 DIAGNOSIS — R10.2 PELVIC AND PERINEAL PAIN: ICD-10-CM

## 2022-06-03 DIAGNOSIS — N93.9 ABNORMAL UTERINE AND VAGINAL BLEEDING, UNSPECIFIED: ICD-10-CM

## 2022-06-03 PROCEDURE — 76816 OB US FOLLOW-UP PER FETUS: CPT | Mod: 26

## 2022-06-06 DIAGNOSIS — O09.90 SUPERVISION OF HIGH RISK PREGNANCY, UNSPECIFIED, UNSPECIFIED TRIMESTER: ICD-10-CM

## 2022-06-06 DIAGNOSIS — Z3A.35 35 WEEKS GESTATION OF PREGNANCY: ICD-10-CM

## 2022-06-06 DIAGNOSIS — Z36.89 ENCOUNTER FOR OTHER SPECIFIED ANTENATAL SCREENING: ICD-10-CM

## 2022-06-06 DIAGNOSIS — O26.849 UTERINE SIZE-DATE DISCREPANCY, UNSPECIFIED TRIMESTER: ICD-10-CM

## 2022-06-09 ENCOUNTER — NON-APPOINTMENT (OUTPATIENT)
Age: 27
End: 2022-06-09

## 2022-06-10 ENCOUNTER — APPOINTMENT (OUTPATIENT)
Dept: OBGYN | Facility: CLINIC | Age: 27
End: 2022-06-10
Payer: COMMERCIAL

## 2022-06-10 ENCOUNTER — RESULT CHARGE (OUTPATIENT)
Age: 27
End: 2022-06-10

## 2022-06-10 ENCOUNTER — OUTPATIENT (OUTPATIENT)
Dept: OUTPATIENT SERVICES | Facility: HOSPITAL | Age: 27
LOS: 1 days | Discharge: HOME | End: 2022-06-10

## 2022-06-10 VITALS — BODY MASS INDEX: 31.12 KG/M2 | SYSTOLIC BLOOD PRESSURE: 131 MMHG | DIASTOLIC BLOOD PRESSURE: 82 MMHG | WEIGHT: 187 LBS

## 2022-06-10 VITALS — DIASTOLIC BLOOD PRESSURE: 86 MMHG | SYSTOLIC BLOOD PRESSURE: 127 MMHG

## 2022-06-10 DIAGNOSIS — Z98.890 OTHER SPECIFIED POSTPROCEDURAL STATES: Chronic | ICD-10-CM

## 2022-06-10 DIAGNOSIS — Z34.93 ENCOUNTER FOR SUPERVISION OF NORMAL PREGNANCY, UNSPECIFIED, THIRD TRIMESTER: ICD-10-CM

## 2022-06-10 PROCEDURE — 99213 OFFICE O/P EST LOW 20 MIN: CPT

## 2022-06-15 LAB
BILIRUB UR QL STRIP: NORMAL
C TRACH RRNA SPEC QL NAA+PROBE: NOT DETECTED
CLARITY UR: NORMAL
COLLECTION METHOD: NORMAL
GLUCOSE UR-MCNC: NORMAL
GP B STREP DNA SPEC QL NAA+PROBE: DETECTED
GP B STREP DNA SPEC QL NAA+PROBE: NORMAL
HCG UR QL: 0.2 EU/DL
HGB UR QL STRIP.AUTO: NORMAL
KETONES UR-MCNC: NORMAL
LEUKOCYTE ESTERASE UR QL STRIP: NORMAL
N GONORRHOEA RRNA SPEC QL NAA+PROBE: NOT DETECTED
NITRITE UR QL STRIP: NORMAL
PH UR STRIP: 6
PROT UR STRIP-MCNC: NORMAL
SOURCE AMPLIFICATION: NORMAL
SOURCE GBS: NORMAL
SP GR UR STRIP: 1.02

## 2022-06-16 ENCOUNTER — NON-APPOINTMENT (OUTPATIENT)
Age: 27
End: 2022-06-16

## 2022-06-17 ENCOUNTER — APPOINTMENT (OUTPATIENT)
Dept: OBGYN | Facility: CLINIC | Age: 27
End: 2022-06-17
Payer: COMMERCIAL

## 2022-06-17 ENCOUNTER — RESULT CHARGE (OUTPATIENT)
Age: 27
End: 2022-06-17

## 2022-06-17 ENCOUNTER — OUTPATIENT (OUTPATIENT)
Dept: OUTPATIENT SERVICES | Facility: HOSPITAL | Age: 27
LOS: 1 days | Discharge: HOME | End: 2022-06-17

## 2022-06-17 VITALS
BODY MASS INDEX: 31.32 KG/M2 | WEIGHT: 188 LBS | DIASTOLIC BLOOD PRESSURE: 68 MMHG | SYSTOLIC BLOOD PRESSURE: 108 MMHG | HEIGHT: 65 IN

## 2022-06-17 DIAGNOSIS — Z98.890 OTHER SPECIFIED POSTPROCEDURAL STATES: Chronic | ICD-10-CM

## 2022-06-17 PROCEDURE — 99213 OFFICE O/P EST LOW 20 MIN: CPT

## 2022-06-22 ENCOUNTER — NON-APPOINTMENT (OUTPATIENT)
Age: 27
End: 2022-06-22

## 2022-06-22 LAB
BILIRUB UR QL STRIP: NORMAL
CLARITY UR: CLEAR
COLLECTION METHOD: NORMAL
GLUCOSE UR-MCNC: NORMAL
HCG UR QL: 0.2 EU/DL
HGB UR QL STRIP.AUTO: NORMAL
KETONES UR-MCNC: NORMAL
LEUKOCYTE ESTERASE UR QL STRIP: NORMAL
NITRITE UR QL STRIP: NORMAL
PH UR STRIP: 7
PROT UR STRIP-MCNC: NORMAL
SP GR UR STRIP: 1.01

## 2022-06-24 ENCOUNTER — RESULT CHARGE (OUTPATIENT)
Age: 27
End: 2022-06-24

## 2022-06-24 ENCOUNTER — NON-APPOINTMENT (OUTPATIENT)
Age: 27
End: 2022-06-24

## 2022-06-24 ENCOUNTER — APPOINTMENT (OUTPATIENT)
Dept: OBGYN | Facility: CLINIC | Age: 27
End: 2022-06-24
Payer: COMMERCIAL

## 2022-06-24 ENCOUNTER — OUTPATIENT (OUTPATIENT)
Dept: OUTPATIENT SERVICES | Facility: HOSPITAL | Age: 27
LOS: 1 days | Discharge: HOME | End: 2022-06-24

## 2022-06-24 VITALS
DIASTOLIC BLOOD PRESSURE: 68 MMHG | WEIGHT: 191.06 LBS | HEIGHT: 65 IN | SYSTOLIC BLOOD PRESSURE: 102 MMHG | BODY MASS INDEX: 31.83 KG/M2

## 2022-06-24 DIAGNOSIS — Z98.890 OTHER SPECIFIED POSTPROCEDURAL STATES: Chronic | ICD-10-CM

## 2022-06-24 PROCEDURE — 99213 OFFICE O/P EST LOW 20 MIN: CPT

## 2022-06-29 LAB
BILIRUB UR QL STRIP: NORMAL
CLARITY UR: NORMAL
COLLECTION METHOD: NORMAL
GLUCOSE UR-MCNC: NORMAL
HCG UR QL: 0.2 EU/DL
HGB UR QL STRIP.AUTO: NORMAL
KETONES UR-MCNC: NORMAL
LEUKOCYTE ESTERASE UR QL STRIP: NORMAL
NITRITE UR QL STRIP: NORMAL
PH UR STRIP: 6
PROT UR STRIP-MCNC: NORMAL
SP GR UR STRIP: 1.02

## 2022-07-01 ENCOUNTER — APPOINTMENT (OUTPATIENT)
Dept: OBGYN | Facility: CLINIC | Age: 27
End: 2022-07-01

## 2022-07-01 ENCOUNTER — OUTPATIENT (OUTPATIENT)
Dept: OUTPATIENT SERVICES | Facility: HOSPITAL | Age: 27
LOS: 1 days | Discharge: HOME | End: 2022-07-01

## 2022-07-01 ENCOUNTER — RESULT CHARGE (OUTPATIENT)
Age: 27
End: 2022-07-01

## 2022-07-01 ENCOUNTER — APPOINTMENT (OUTPATIENT)
Dept: PULMONOLOGY | Facility: CLINIC | Age: 27
End: 2022-07-01

## 2022-07-01 VITALS
WEIGHT: 190 LBS | HEIGHT: 65 IN | BODY MASS INDEX: 31.65 KG/M2 | DIASTOLIC BLOOD PRESSURE: 68 MMHG | SYSTOLIC BLOOD PRESSURE: 120 MMHG

## 2022-07-01 DIAGNOSIS — Z98.890 OTHER SPECIFIED POSTPROCEDURAL STATES: Chronic | ICD-10-CM

## 2022-07-01 PROCEDURE — 99213 OFFICE O/P EST LOW 20 MIN: CPT

## 2022-07-06 ENCOUNTER — INPATIENT (INPATIENT)
Facility: HOSPITAL | Age: 27
LOS: 1 days | Discharge: HOME | End: 2022-07-08
Attending: OBSTETRICS & GYNECOLOGY | Admitting: OBSTETRICS & GYNECOLOGY

## 2022-07-06 VITALS — SYSTOLIC BLOOD PRESSURE: 149 MMHG | HEART RATE: 94 BPM | DIASTOLIC BLOOD PRESSURE: 99 MMHG

## 2022-07-06 DIAGNOSIS — Z98.890 OTHER SPECIFIED POSTPROCEDURAL STATES: Chronic | ICD-10-CM

## 2022-07-06 LAB
ALBUMIN SERPL ELPH-MCNC: 3.9 G/DL — SIGNIFICANT CHANGE UP (ref 3.5–5.2)
ALLERGY+IMMUNOLOGY DIAG STUDY NOTE: SIGNIFICANT CHANGE UP
ALP SERPL-CCNC: 178 U/L — HIGH (ref 30–115)
ALT FLD-CCNC: 20 U/L — SIGNIFICANT CHANGE UP (ref 0–41)
AMPHET UR-MCNC: NEGATIVE — SIGNIFICANT CHANGE UP
ANION GAP SERPL CALC-SCNC: 14 MMOL/L — SIGNIFICANT CHANGE UP (ref 7–14)
APTT BLD: 27.3 SEC — SIGNIFICANT CHANGE UP (ref 27–39.2)
AST SERPL-CCNC: 25 U/L — SIGNIFICANT CHANGE UP (ref 0–41)
BARBITURATES UR SCN-MCNC: NEGATIVE — SIGNIFICANT CHANGE UP
BASOPHILS # BLD AUTO: 0.01 K/UL — SIGNIFICANT CHANGE UP (ref 0–0.2)
BASOPHILS NFR BLD AUTO: 0.2 % — SIGNIFICANT CHANGE UP (ref 0–1)
BENZODIAZ UR-MCNC: NEGATIVE — SIGNIFICANT CHANGE UP
BILIRUB SERPL-MCNC: <0.2 MG/DL — SIGNIFICANT CHANGE UP (ref 0.2–1.2)
BILIRUB UR QL STRIP: NORMAL
BLD GP AB SCN SERPL QL: SIGNIFICANT CHANGE UP
BUN SERPL-MCNC: 12 MG/DL — SIGNIFICANT CHANGE UP (ref 10–20)
BUPRENORPHINE SCREEN, URINE RESULT: NEGATIVE — SIGNIFICANT CHANGE UP
CALCIUM SERPL-MCNC: 9.4 MG/DL — SIGNIFICANT CHANGE UP (ref 8.5–10.1)
CHLORIDE SERPL-SCNC: 102 MMOL/L — SIGNIFICANT CHANGE UP (ref 98–110)
CLARITY UR: CLEAR
CO2 SERPL-SCNC: 20 MMOL/L — SIGNIFICANT CHANGE UP (ref 17–32)
COCAINE METAB.OTHER UR-MCNC: NEGATIVE — SIGNIFICANT CHANGE UP
COLLECTION METHOD: NORMAL
CREAT ?TM UR-MCNC: 128 MG/DL — SIGNIFICANT CHANGE UP
CREAT SERPL-MCNC: 0.5 MG/DL — LOW (ref 0.7–1.5)
DIR ANTIGLOB POLYSPECIFIC INTERPRETATION: SIGNIFICANT CHANGE UP
EGFR: 133 ML/MIN/1.73M2 — SIGNIFICANT CHANGE UP
EOSINOPHIL # BLD AUTO: 0.07 K/UL — SIGNIFICANT CHANGE UP (ref 0–0.7)
EOSINOPHIL NFR BLD AUTO: 1.3 % — SIGNIFICANT CHANGE UP (ref 0–8)
FENTANYL UR QL: POSITIVE
FIBRINOGEN PPP-MCNC: 489 MG/DL — SIGNIFICANT CHANGE UP (ref 204.4–570.6)
GLUCOSE SERPL-MCNC: 78 MG/DL — SIGNIFICANT CHANGE UP (ref 70–99)
GLUCOSE UR-MCNC: NORMAL
HCG UR QL: 0.2 EU/DL
HCT VFR BLD CALC: 40 % — SIGNIFICANT CHANGE UP (ref 37–47)
HGB BLD-MCNC: 13.5 G/DL — SIGNIFICANT CHANGE UP (ref 12–16)
HGB UR QL STRIP.AUTO: NORMAL
IMM GRANULOCYTES NFR BLD AUTO: 0.4 % — HIGH (ref 0.1–0.3)
INR BLD: 0.86 RATIO — SIGNIFICANT CHANGE UP (ref 0.65–1.3)
KETONES UR-MCNC: NORMAL
L&D DRUG SCREEN, URINE: SIGNIFICANT CHANGE UP
LDH SERPL L TO P-CCNC: 220 — SIGNIFICANT CHANGE UP (ref 50–242)
LEUKOCYTE ESTERASE UR QL STRIP: NORMAL
LYMPHOCYTES # BLD AUTO: 1.92 K/UL — SIGNIFICANT CHANGE UP (ref 1.2–3.4)
LYMPHOCYTES # BLD AUTO: 36.3 % — SIGNIFICANT CHANGE UP (ref 20.5–51.1)
MCHC RBC-ENTMCNC: 31.3 PG — HIGH (ref 27–31)
MCHC RBC-ENTMCNC: 33.8 G/DL — SIGNIFICANT CHANGE UP (ref 32–37)
MCV RBC AUTO: 92.8 FL — SIGNIFICANT CHANGE UP (ref 81–99)
METHADONE UR-MCNC: NEGATIVE — SIGNIFICANT CHANGE UP
MONOCYTES # BLD AUTO: 0.53 K/UL — SIGNIFICANT CHANGE UP (ref 0.1–0.6)
MONOCYTES NFR BLD AUTO: 10 % — HIGH (ref 1.7–9.3)
NEUTROPHILS # BLD AUTO: 2.74 K/UL — SIGNIFICANT CHANGE UP (ref 1.4–6.5)
NEUTROPHILS NFR BLD AUTO: 51.8 % — SIGNIFICANT CHANGE UP (ref 42.2–75.2)
NITRITE UR QL STRIP: NORMAL
NRBC # BLD: 0 /100 WBCS — SIGNIFICANT CHANGE UP (ref 0–0)
OPIATES UR-MCNC: NEGATIVE — SIGNIFICANT CHANGE UP
OXYCODONE UR-MCNC: NEGATIVE — SIGNIFICANT CHANGE UP
PCP UR-MCNC: NEGATIVE — SIGNIFICANT CHANGE UP
PH UR STRIP: 6
PLATELET # BLD AUTO: 154 K/UL — SIGNIFICANT CHANGE UP (ref 130–400)
POTASSIUM SERPL-MCNC: 4.3 MMOL/L — SIGNIFICANT CHANGE UP (ref 3.5–5)
POTASSIUM SERPL-SCNC: 4.3 MMOL/L — SIGNIFICANT CHANGE UP (ref 3.5–5)
PRENATAL SYPHILIS TEST: SIGNIFICANT CHANGE UP
PROPOXYPHENE QUALITATIVE URINE RESULT: NEGATIVE — SIGNIFICANT CHANGE UP
PROT ?TM UR-MCNC: 33 MG/DLG/24H — SIGNIFICANT CHANGE UP
PROT SERPL-MCNC: 6.4 G/DL — SIGNIFICANT CHANGE UP (ref 6–8)
PROT UR STRIP-MCNC: NORMAL
PROT/CREAT UR-RTO: 0.3 RATIO — HIGH (ref 0–0.2)
PROTHROM AB SERPL-ACNC: 9.9 SEC — LOW (ref 9.95–12.87)
RBC # BLD: 4.31 M/UL — SIGNIFICANT CHANGE UP (ref 4.2–5.4)
RBC # FLD: 14 % — SIGNIFICANT CHANGE UP (ref 11.5–14.5)
SARS-COV-2 RNA SPEC QL NAA+PROBE: SIGNIFICANT CHANGE UP
SODIUM SERPL-SCNC: 136 MMOL/L — SIGNIFICANT CHANGE UP (ref 135–146)
SP GR UR STRIP: 1.01
URATE SERPL-MCNC: 5.2 MG/DL — SIGNIFICANT CHANGE UP (ref 2.5–7)
WBC # BLD: 5.29 K/UL — SIGNIFICANT CHANGE UP (ref 4.8–10.8)
WBC # FLD AUTO: 5.29 K/UL — SIGNIFICANT CHANGE UP (ref 4.8–10.8)

## 2022-07-06 PROCEDURE — 86077 PHYS BLOOD BANK SERV XMATCH: CPT

## 2022-07-06 RX ORDER — CHLORHEXIDINE GLUCONATE 213 G/1000ML
1 SOLUTION TOPICAL ONCE
Refills: 0 | Status: DISCONTINUED | OUTPATIENT
Start: 2022-07-06 | End: 2022-07-06

## 2022-07-06 RX ORDER — NALOXONE HYDROCHLORIDE 4 MG/.1ML
0.1 SPRAY NASAL
Refills: 0 | Status: DISCONTINUED | OUTPATIENT
Start: 2022-07-06 | End: 2022-07-07

## 2022-07-06 RX ORDER — OXYTOCIN 10 UNIT/ML
333.33 VIAL (ML) INJECTION
Qty: 20 | Refills: 0 | Status: DISCONTINUED | OUTPATIENT
Start: 2022-07-06 | End: 2022-07-07

## 2022-07-06 RX ORDER — OXYTOCIN 10 UNIT/ML
VIAL (ML) INJECTION
Qty: 30 | Refills: 0 | Status: DISCONTINUED | OUTPATIENT
Start: 2022-07-06 | End: 2022-07-07

## 2022-07-06 RX ORDER — FENTANYL/BUPIVACAINE/NS/PF 2MCG/ML-.1
250 PLASTIC BAG, INJECTION (ML) INJECTION
Refills: 0 | Status: DISCONTINUED | OUTPATIENT
Start: 2022-07-06 | End: 2022-07-07

## 2022-07-06 RX ORDER — ONDANSETRON 8 MG/1
4 TABLET, FILM COATED ORAL EVERY 6 HOURS
Refills: 0 | Status: DISCONTINUED | OUTPATIENT
Start: 2022-07-06 | End: 2022-07-07

## 2022-07-06 RX ORDER — DEXAMETHASONE 0.5 MG/5ML
4 ELIXIR ORAL EVERY 6 HOURS
Refills: 0 | Status: DISCONTINUED | OUTPATIENT
Start: 2022-07-06 | End: 2022-07-07

## 2022-07-06 RX ORDER — SODIUM CHLORIDE 9 MG/ML
1000 INJECTION, SOLUTION INTRAVENOUS
Refills: 0 | Status: DISCONTINUED | OUTPATIENT
Start: 2022-07-06 | End: 2022-07-07

## 2022-07-06 RX ORDER — AMPICILLIN TRIHYDRATE 250 MG
1 CAPSULE ORAL EVERY 4 HOURS
Refills: 0 | Status: DISCONTINUED | OUTPATIENT
Start: 2022-07-06 | End: 2022-07-07

## 2022-07-06 RX ORDER — MONTELUKAST 4 MG/1
10 TABLET, CHEWABLE ORAL DAILY
Refills: 0 | Status: DISCONTINUED | OUTPATIENT
Start: 2022-07-06 | End: 2022-07-07

## 2022-07-06 RX ORDER — NORETHINDRONE AND ETHINYL ESTRADIOL 0.4-0.035
0 KIT ORAL
Qty: 0 | Refills: 0 | DISCHARGE

## 2022-07-06 RX ORDER — LORATADINE 10 MG/1
10 TABLET ORAL DAILY
Refills: 0 | Status: DISCONTINUED | OUTPATIENT
Start: 2022-07-06 | End: 2022-07-07

## 2022-07-06 RX ORDER — AMPICILLIN TRIHYDRATE 250 MG
2 CAPSULE ORAL ONCE
Refills: 0 | Status: COMPLETED | OUTPATIENT
Start: 2022-07-06 | End: 2022-07-06

## 2022-07-06 RX ORDER — ALBUTEROL 90 UG/1
2 AEROSOL, METERED ORAL EVERY 6 HOURS
Refills: 0 | Status: DISCONTINUED | OUTPATIENT
Start: 2022-07-06 | End: 2022-07-07

## 2022-07-06 RX ADMIN — Medication 108 GRAM(S): at 20:36

## 2022-07-06 RX ADMIN — Medication 200 GRAM(S): at 08:15

## 2022-07-06 RX ADMIN — Medication 108 GRAM(S): at 12:15

## 2022-07-06 RX ADMIN — LORATADINE 10 MILLIGRAM(S): 10 TABLET ORAL at 12:09

## 2022-07-06 RX ADMIN — SODIUM CHLORIDE 125 MILLILITER(S): 9 INJECTION, SOLUTION INTRAVENOUS at 15:24

## 2022-07-06 RX ADMIN — SODIUM CHLORIDE 125 MILLILITER(S): 9 INJECTION, SOLUTION INTRAVENOUS at 20:44

## 2022-07-06 RX ADMIN — MONTELUKAST 10 MILLIGRAM(S): 4 TABLET, CHEWABLE ORAL at 12:09

## 2022-07-06 RX ADMIN — Medication 2 MILLIUNIT(S)/MIN: at 11:04

## 2022-07-06 RX ADMIN — Medication 108 GRAM(S): at 16:15

## 2022-07-06 RX ADMIN — SODIUM CHLORIDE 125 MILLILITER(S): 9 INJECTION, SOLUTION INTRAVENOUS at 07:50

## 2022-07-06 NOTE — OB PROVIDER H&P - ASSESSMENT
27y  @ 39w5d, Rh-/GBS+, in early labor, with elevated BPs (r/o gHTN/PEC).    Admit to L&D  Admission labs, PEC and abruption labs  IV hydration  IV ampicillin per protocol for GBS prophylaxis  Continuous EFM & TOCO monitoring  Clear Liquid diet  Pain Management PRN  Labor augmentation w/pitocin per unit protocol  Asthma medications ordered: albuterol PRN, singulair 10mg PO daily, claritin 10 mg PO daily (pharmacy substitute for [facility])    Dr. Martines PGY-4 and Dr. Estrada aware    [place]  ID# [id number]

## 2022-07-06 NOTE — CHART NOTE - NSCHARTNOTESELECT_GEN_ALL_CORE
Event Note Mercedes Flap Text: The defect edges were debeveled with a #15 scalpel blade.  Given the location of the defect, shape of the defect and the proximity to free margins a Mercedes flap was deemed most appropriate.  Using a sterile surgical marker, an appropriate advancement flap was drawn incorporating the defect and placing the expected incisions within the relaxed skin tension lines where possible. The area thus outlined was incised deep to adipose tissue with a #15 scalpel blade.  The skin margins were undermined to an appropriate distance in all directions utilizing iris scissors.

## 2022-07-06 NOTE — PROGRESS NOTE ADULT - ASSESSMENT
27y  @ 39w5d, Rh-/GBS+, in early labor, preeclamptic w/o severe features    -IV hydration  -GBS prophylaxis, IV ampicillin  -Continuous EFM & TOCO monitoring  -Clear Liquid diet  -Pain Management PRN, epidural  -Continue Pitocin    Dr. Estrada and Dr. Martines aware

## 2022-07-06 NOTE — PROGRESS NOTE ADULT - ASSESSMENT
27y  @ 39w5d, Rh-/GBS+, in early labor, with elevated BPs (r/o gHTN/PEC), s/p SROM, desires epidural  - Cont EFM/[place]  - IV Hydration  - Pain Management prn  - Monitor vitals  - Clear Liquids  - Cont pitocin  - Anesthesia for epidural    Dr Martines and Dr Estrada aware

## 2022-07-06 NOTE — OB RN DELIVERY SUMMARY - NSSELHIDDEN_OBGYN_ALL_OB_FT
[NS_DeliveryAttending1_OBGYN_ALL_OB_FT:[email or web address]=],[NS_DeliveryRN_OBGYN_ALL_OB_FT:[email or web address]=] [NS_DeliveryAttending1_OBGYN_ALL_OB_FT:[email or web address]=],[NS_DeliveryRN_OBGYN_ALL_OB_FT:[email or web address]=],[NS_DeliveryAssist1_OBGYN_ALL_OB_FT:[email or web address]=],[NS_DeliveryAssist2_OBGYN_ALL_OB_FT:[id number]=]

## 2022-07-06 NOTE — OB PROVIDER H&P - HISTORY OF PRESENT ILLNESS
27y  @ 39w5d by LMP/first trimester US, FRANCISCO 22, presents for contractions with small amount of bloody/mucoid discharge since 4 am. Rh-/ GBS +. Denies LOF. +FM.     Complications with this pregnancy:  -Rh-negative, s/p RhoGam 22  -h/o/ asthma (hospitalized as a child, denies intubations); prescribed albuterol PRN, zyrtec daily, fluticasone 220 mcg puff bid  -Cholelithiasis + gallbladder sludge (seen in triage 2022); will follow-up w/surgery after pregnancy 27y  @ 39w5d by LMP/first trimester US, FRANCISCO 22, presents for contractions with small amount of bloody/mucoid discharge since 4 am. Rh-/ GBS +. Denies LOF. +FM.     Complications with this pregnancy:  -Rh-negative, s/p RhoGam 22  -h/o/ asthma (hospitalized as a child, denies intubations); prescribed albuterol PRN, zyrtec daily, singulair and zyrtec daily  -Cholelithiasis + gallbladder sludge (seen in triage 2022); will follow-up w/surgery after pregnancy

## 2022-07-06 NOTE — OB RN PATIENT PROFILE - FALL HARM RISK - UNIVERSAL INTERVENTIONS
Bed in lowest position, wheels locked, appropriate side rails in place/Call bell, personal items and telephone in reach/Instruct patient to call for assistance before getting out of bed or chair/Non-slip footwear when patient is out of bed/[place] to call system/Physically safe environment - no spills, clutter or unnecessary equipment/Purposeful Proactive Rounding/Room/bathroom lighting operational, light cord in reach

## 2022-07-06 NOTE — CHART NOTE - NSCHARTNOTEFT_GEN_A_CORE
PGY 2 Note    At patient bedside for evaluation. Complaining of pain. SVE: 6-7/100/+1. Will call anesthesia for top off and reposition.     Dr Green and Dr Estrada aware

## 2022-07-06 NOTE — OB PROVIDER H&P - NSHPPHYSICALEXAM_GEN_ALL_CORE
T(C): 36.8 (07-06-22 @ 07:40), Max: 36.9 (07-06-22 @ 06:39)  HR: 96 (07-06-22 @ 09:54) (74 - 105)  BP: 136/92 (07-06-22 @ 10:09) (128/89 - 154/101)  RR: 16 (07-06-22 @ 07:40) (16 - 18)    General: alert, oriented, no acute distress  Abd: soft, gravid, nontender, no tenderness  Extremities: no sign of DVT on exam    EFM: 145 bpm, moderate variability, +accels (Cat 1)  TOCO: q 5-6 min    SVE: 2/70/-2, vertex, intact  small amount of bloody/mucoid discharge

## 2022-07-06 NOTE — OB RN DELIVERY SUMMARY - NS_SEPSISRSKCALC_OBGYN_ALL_OB_FT
EOS calculated successfully. EOS Risk Factor: 0.5/1000 live births ([facility] national incidence); GA=39w6d; Temp=99.14; ROM=20.667; GBS='Positive'; Antibiotics='GBS specific antibiotics > 2 hrs prior to birth'

## 2022-07-06 NOTE — OB RN PATIENT PROFILE - FUNCTIONAL ASSESSMENT - BASIC MOBILITY 6.
4-calculated by average/Not able to assess (calculate score using [facility] averaging method)

## 2022-07-06 NOTE — OB PROVIDER H&P - NS_OBGYNHISTORY_OBGYN_ALL_OB_FT
OB Hx:     Gyn Hx:  Denies cysts, fibroids, abnormal paps, and STIs.   on MRI : subseptate uterus, adenomyosis

## 2022-07-06 NOTE — PROCEDURE NOTE - ADDITIONAL PROCEDURE DETAILS
VSS post epidural placement  Analgesia at T10 R=L  FH wnl  PCEA commenced  Bupivacaine 0.0625% + fentanyl 2mcg/ml  CEI 12ml/hr ; demand dose 5ml; Lockout 15min VSS post epidural placement  Analgesia at T10 R=L  FH wnl  PCEA commenced  Bupivacaine 0.0625% + fentanyl 2mcg/ml  CEI 12ml/hr ; demand dose 5ml; Lockout 15min  7.7.22  04.00am Labour epidural replaced.Patient not getting adequate pain relief with existing epidural.  CSE performed under sterile technique.Pt.stable and comfortable.VSS  New epidural bag replaced. VSS post epidural placement  Analgesia at T10 R=L  FH wnl  PCEA commenced  Bupivacaine 0.0625% + fentanyl 2mcg/ml  CEI 12ml/hr ; demand dose 5ml; Lockout 15min  7.7.22  04.00am Labour epidural replaced.Patient not getting adequate pain relief with existing epidural.  CSE performed under sterile technique.Pt.stable and comfortable.VSS  New epidural bag replaced.    0952 top off for repair  2% Lidocaine 5 ml given in small increments after negative aspiration, VSS VSS post epidural placement  Analgesia at T10 R=L  FH wnl  PCEA commenced  Bupivacaine 0.0625% + fentanyl 2mcg/ml  CEI 12ml/hr ; demand dose 5ml; Lockout 15min  7.7.22  04.00am Labour epidural replaced.Patient not getting adequate pain relief with existing epidural.  CSE performed under sterile technique.Pt.stable and comfortable.VSS  New epidural bag replaced.    0952 top off for repair  2% Lidocaine 5 ml given in small increments after negative aspiration, VSS    1030 Post Delivery  Evaluation Note:    Uncomplicated anesthetic for Vaginal Delivery.    Patient seen at bedside. Epidural to be removed by RN before patient transfer.  Patient moving B/L lower extremities.  Motor block appropriate and resolving. Vital Signs are stable. Pain well controlled.     Esha Score greater than 9    Mental Status:  __x__ Awake   ___x__ Alert   _____ Drowsy   _____ Sedated    Nausea/Vomiting:  __x__ NO  ______Yes,   See Post - Op Orders          Pain Scale (0-10):  _____    Treatment: __X__ None       Plan: Discharge:   ____Home       __X___Floor     _____Critical Care    _____

## 2022-07-06 NOTE — OB RN TRIAGE NOTE - FALL HARM RISK - UNIVERSAL INTERVENTIONS
Bed in lowest position, wheels locked, appropriate side rails in place/Call bell, personal items and telephone in reach/Instruct patient to call for assistance before getting out of bed or chair/Non-slip footwear when patient is out of bed/[place] to call system/Physically safe environment - no spills, clutter or unnecessary equipment/Purposeful Proactive Rounding/Room/bathroom lighting operational, light cord in reach

## 2022-07-06 NOTE — OB PROVIDER H&P - NSHPLABSRESULTS_GEN_ALL_CORE
US  6/3/22 @ 35w: SIUP, vertex, anterior placenta, EFW 2491 g (38%ile)  5/6/22 @ 31w: EFW 1772 (54%ile)  4/7/22 @ 26w6d: remaining anatomy wnl  2/25/22 and 3/18/22: fetal echo WNL  2/24/22 @ 20w6d: SIUP, vertex, anterior placenta, VSD, anatomy incomplete  12/22/21 @ 11w5d: SIUP, NT wnl, FRANCISCO 7/8/22

## 2022-07-06 NOTE — PROGRESS NOTE ADULT - ASSESSMENT
A/P: 27y  at 39w5d GA, Rh neg (s/p RhoGAM on 2022), GBS pos; pre-eclampsia without severe features; in labor  -Continue pitocin as tolerated   -Pain management prn  -Continuous EFM/toco  -F/u pending UDS  -diet: CLD  -IVF hydration     Discussed with Dr. Estrada

## 2022-07-07 ENCOUNTER — RESULT REVIEW (OUTPATIENT)
Age: 27
End: 2022-07-07

## 2022-07-07 LAB
BASOPHILS # BLD AUTO: 0.02 K/UL — SIGNIFICANT CHANGE UP (ref 0–0.2)
BASOPHILS NFR BLD AUTO: 0.2 % — SIGNIFICANT CHANGE UP (ref 0–1)
EOSINOPHIL # BLD AUTO: 0.08 K/UL — SIGNIFICANT CHANGE UP (ref 0–0.7)
EOSINOPHIL NFR BLD AUTO: 0.7 % — SIGNIFICANT CHANGE UP (ref 0–8)
HCT VFR BLD CALC: 28.2 % — LOW (ref 37–47)
HGB BLD-MCNC: 9.5 G/DL — LOW (ref 12–16)
IMM GRANULOCYTES NFR BLD AUTO: 0.4 % — HIGH (ref 0.1–0.3)
LYMPHOCYTES # BLD AUTO: 1.55 K/UL — SIGNIFICANT CHANGE UP (ref 1.2–3.4)
LYMPHOCYTES # BLD AUTO: 12.9 % — LOW (ref 20.5–51.1)
MCHC RBC-ENTMCNC: 32.1 PG — HIGH (ref 27–31)
MCHC RBC-ENTMCNC: 33.7 G/DL — SIGNIFICANT CHANGE UP (ref 32–37)
MCV RBC AUTO: 95.3 FL — SIGNIFICANT CHANGE UP (ref 81–99)
MONOCYTES # BLD AUTO: 1.17 K/UL — HIGH (ref 0.1–0.6)
MONOCYTES NFR BLD AUTO: 9.7 % — HIGH (ref 1.7–9.3)
NEUTROPHILS # BLD AUTO: 9.17 K/UL — HIGH (ref 1.4–6.5)
NEUTROPHILS NFR BLD AUTO: 76.1 % — HIGH (ref 42.2–75.2)
NRBC # BLD: 0 /100 WBCS — SIGNIFICANT CHANGE UP (ref 0–0)
PLATELET # BLD AUTO: 122 K/UL — LOW (ref 130–400)
RBC # BLD: 2.96 M/UL — LOW (ref 4.2–5.4)
RBC # FLD: 14.3 % — SIGNIFICANT CHANGE UP (ref 11.5–14.5)
WBC # BLD: 12.04 K/UL — HIGH (ref 4.8–10.8)
WBC # FLD AUTO: 12.04 K/UL — HIGH (ref 4.8–10.8)

## 2022-07-07 PROCEDURE — 88307 TISSUE EXAM BY PATHOLOGIST: CPT | Mod: 26

## 2022-07-07 PROCEDURE — 59409 OBSTETRICAL CARE: CPT | Mod: U9

## 2022-07-07 RX ORDER — BENZOCAINE 10 %
1 GEL (GRAM) MUCOUS MEMBRANE EVERY 6 HOURS
Refills: 0 | Status: DISCONTINUED | OUTPATIENT
Start: 2022-07-07 | End: 2022-07-08

## 2022-07-07 RX ORDER — IBUPROFEN 200 MG
600 TABLET ORAL EVERY 6 HOURS
Refills: 0 | Status: COMPLETED | OUTPATIENT
Start: 2022-07-07 | End: 2023-06-05

## 2022-07-07 RX ORDER — DIBUCAINE 1 %
1 OINTMENT (GRAM) RECTAL EVERY 6 HOURS
Refills: 0 | Status: DISCONTINUED | OUTPATIENT
Start: 2022-07-07 | End: 2022-07-08

## 2022-07-07 RX ORDER — DIPHENHYDRAMINE HCL 50 MG
25 CAPSULE ORAL EVERY 6 HOURS
Refills: 0 | Status: DISCONTINUED | OUTPATIENT
Start: 2022-07-07 | End: 2022-07-08

## 2022-07-07 RX ORDER — OXYCODONE HYDROCHLORIDE 5 MG/1
5 TABLET ORAL ONCE
Refills: 0 | Status: DISCONTINUED | OUTPATIENT
Start: 2022-07-07 | End: 2022-07-08

## 2022-07-07 RX ORDER — SIMETHICONE 80 MG/1
80 TABLET, CHEWABLE ORAL EVERY 4 HOURS
Refills: 0 | Status: DISCONTINUED | OUTPATIENT
Start: 2022-07-07 | End: 2022-07-08

## 2022-07-07 RX ORDER — PRAMOXINE HYDROCHLORIDE 150 MG/15G
1 AEROSOL, FOAM RECTAL EVERY 4 HOURS
Refills: 0 | Status: DISCONTINUED | OUTPATIENT
Start: 2022-07-07 | End: 2022-07-08

## 2022-07-07 RX ORDER — AER TRAVELER 0.5 G/1
1 SOLUTION RECTAL; TOPICAL EVERY 4 HOURS
Refills: 0 | Status: DISCONTINUED | OUTPATIENT
Start: 2022-07-07 | End: 2022-07-08

## 2022-07-07 RX ORDER — OXYCODONE HYDROCHLORIDE 5 MG/1
5 TABLET ORAL
Refills: 0 | Status: DISCONTINUED | OUTPATIENT
Start: 2022-07-07 | End: 2022-07-08

## 2022-07-07 RX ORDER — HYDROCORTISONE 1 %
1 OINTMENT (GRAM) TOPICAL EVERY 6 HOURS
Refills: 0 | Status: DISCONTINUED | OUTPATIENT
Start: 2022-07-07 | End: 2022-07-08

## 2022-07-07 RX ORDER — IBUPROFEN 200 MG
600 TABLET ORAL EVERY 6 HOURS
Refills: 0 | Status: DISCONTINUED | OUTPATIENT
Start: 2022-07-07 | End: 2022-07-08

## 2022-07-07 RX ORDER — MAGNESIUM HYDROXIDE 400 MG/1
30 TABLET, CHEWABLE ORAL
Refills: 0 | Status: DISCONTINUED | OUTPATIENT
Start: 2022-07-07 | End: 2022-07-08

## 2022-07-07 RX ORDER — SODIUM CHLORIDE 9 MG/ML
3 INJECTION INTRAMUSCULAR; INTRAVENOUS; SUBCUTANEOUS EVERY 8 HOURS
Refills: 0 | Status: DISCONTINUED | OUTPATIENT
Start: 2022-07-07 | End: 2022-07-08

## 2022-07-07 RX ORDER — TETANUS TOXOID, REDUCED DIPHTHERIA TOXOID AND ACELLULAR PERTUSSIS VACCINE, ADSORBED 5; 2.5; 8; 8; 2.5 [IU]/.5ML; [IU]/.5ML; UG/.5ML; UG/.5ML; UG/.5ML
0.5 SUSPENSION INTRAMUSCULAR ONCE
Refills: 0 | Status: DISCONTINUED | OUTPATIENT
Start: 2022-07-07 | End: 2022-07-08

## 2022-07-07 RX ORDER — LANOLIN
1 OINTMENT (GRAM) TOPICAL EVERY 6 HOURS
Refills: 0 | Status: DISCONTINUED | OUTPATIENT
Start: 2022-07-07 | End: 2022-07-08

## 2022-07-07 RX ORDER — OXYTOCIN 10 UNIT/ML
333.33 VIAL (ML) INJECTION
Qty: 20 | Refills: 0 | Status: DISCONTINUED | OUTPATIENT
Start: 2022-07-07 | End: 2022-07-08

## 2022-07-07 RX ORDER — CEFAZOLIN SODIUM 1 G
2000 VIAL (EA) INJECTION ONCE
Refills: 0 | Status: COMPLETED | OUTPATIENT
Start: 2022-07-07 | End: 2022-07-07

## 2022-07-07 RX ORDER — ACETAMINOPHEN 500 MG
975 TABLET ORAL
Refills: 0 | Status: DISCONTINUED | OUTPATIENT
Start: 2022-07-07 | End: 2022-07-08

## 2022-07-07 RX ORDER — KETOROLAC TROMETHAMINE 30 MG/ML
30 SYRINGE (ML) INJECTION ONCE
Refills: 0 | Status: DISCONTINUED | OUTPATIENT
Start: 2022-07-07 | End: 2022-07-07

## 2022-07-07 RX ADMIN — Medication 100 MILLIGRAM(S): at 11:23

## 2022-07-07 RX ADMIN — Medication 600 MILLIGRAM(S): at 18:13

## 2022-07-07 RX ADMIN — SODIUM CHLORIDE 125 MILLILITER(S): 9 INJECTION, SOLUTION INTRAVENOUS at 07:05

## 2022-07-07 RX ADMIN — Medication 975 MILLIGRAM(S): at 22:03

## 2022-07-07 RX ADMIN — Medication 108 GRAM(S): at 08:30

## 2022-07-07 RX ADMIN — Medication 600 MILLIGRAM(S): at 18:43

## 2022-07-07 RX ADMIN — Medication 30 MILLIGRAM(S): at 10:20

## 2022-07-07 RX ADMIN — Medication 1000 MILLIUNIT(S)/MIN: at 10:41

## 2022-07-07 RX ADMIN — SODIUM CHLORIDE 3 MILLILITER(S): 9 INJECTION INTRAMUSCULAR; INTRAVENOUS; SUBCUTANEOUS at 23:55

## 2022-07-07 RX ADMIN — Medication 108 GRAM(S): at 00:31

## 2022-07-07 RX ADMIN — Medication 975 MILLIGRAM(S): at 22:33

## 2022-07-07 RX ADMIN — Medication 108 GRAM(S): at 04:29

## 2022-07-07 RX ADMIN — Medication 30 MILLIGRAM(S): at 11:00

## 2022-07-07 NOTE — PROGRESS NOTE ADULT - ASSESSMENT
A/P: 27y  at 39w5d GA, Rh neg (s/p RhoGAM on 2022), GBS pos; pre-eclampsia without severe features with intermittently elevated severe range blood pressures; in labor  -Continue pitocin as tolerated   -Pain management prn  -Continuous EFM/toco  -F/u pending UDS  -diet: CLD  -IVF hydration     Discussed with Dr. Estrada

## 2022-07-07 NOTE — OB PROVIDER DELIVERY SUMMARY - NSPROVIDERDELIVERYNOTE_OBGYN_ALL_OB_FT
Patient was fully dilated and pushed. Fetal head delivered OA, and restituted to ROT. The anterior and  posterior shoulders delivered, followed by the remaining body atraumatically. Delayed cord clamping was performed for 1 minute, and then was clamped and cut. Cord blood & gases collected. Cord blood was collected per pt. request. The  was handed to mother for skin to skin. The placenta remained attached, intact with 3v cord. Uterus massaged and firm with oxytocin given IV, patient stable. Cervix, vagina and perineum inspected. Repair of 2nd degree laceration repaired in the usual fashion with 2-0 chromic. Placenta and membranes delivered by manual extraction after 30 minutes. Sonogram of the uterus after manual extraction showed a uterine stripe of 1.68cm.  live female, APGARS 9/9 delivered. EBL -500, hemostasis assured, uterus firm, patient in stable condition. Extra dose Ancef 2g ordered.

## 2022-07-07 NOTE — PROCEDURAL SAFETY CHECKLIST WITH OR WITHOUT SEDATION - NSPRESURGSED_GEN_ALL_CORE
KAMILLA FOR CHART:  When calling patient to set up next appointment she informed me that she now sees all [facility] doctors and will not be seeing Dr Jin Mathis any longer.       Preferred contact number#  [phone]
Present, accurate, and signed
n/a

## 2022-07-07 NOTE — OB PROVIDER DELIVERY SUMMARY - NSSELHIDDEN_OBGYN_ALL_OB_FT
[NS_DeliveryAttending1_OBGYN_ALL_OB_FT:[email or web address]=],[NS_DeliveryRN_OBGYN_ALL_OB_FT:[email or web address]=]

## 2022-07-08 ENCOUNTER — APPOINTMENT (OUTPATIENT)
Dept: OBGYN | Facility: CLINIC | Age: 27
End: 2022-07-08

## 2022-07-08 ENCOUNTER — TRANSCRIPTION ENCOUNTER (OUTPATIENT)
Age: 27
End: 2022-07-08

## 2022-07-08 VITALS
SYSTOLIC BLOOD PRESSURE: 121 MMHG | RESPIRATION RATE: 18 BRPM | DIASTOLIC BLOOD PRESSURE: 80 MMHG | HEART RATE: 86 BPM | TEMPERATURE: 96 F

## 2022-07-08 PROBLEM — K80.20 CALCULUS OF GALLBLADDER WITHOUT CHOLECYSTITIS WITHOUT OBSTRUCTION: Chronic | Status: ACTIVE | Noted: 2022-07-06

## 2022-07-08 LAB
BASOPHILS # BLD AUTO: 0.02 K/UL — SIGNIFICANT CHANGE UP (ref 0–0.2)
BASOPHILS NFR BLD AUTO: 0.2 % — SIGNIFICANT CHANGE UP (ref 0–1)
EOSINOPHIL # BLD AUTO: 0.11 K/UL — SIGNIFICANT CHANGE UP (ref 0–0.7)
EOSINOPHIL NFR BLD AUTO: 0.9 % — SIGNIFICANT CHANGE UP (ref 0–8)
FETAL SCREEN: SIGNIFICANT CHANGE UP
HCT VFR BLD CALC: 30.4 % — LOW (ref 37–47)
HGB BLD-MCNC: 10.1 G/DL — LOW (ref 12–16)
IMM GRANULOCYTES NFR BLD AUTO: 0.5 % — HIGH (ref 0.1–0.3)
LYMPHOCYTES # BLD AUTO: 2.47 K/UL — SIGNIFICANT CHANGE UP (ref 1.2–3.4)
LYMPHOCYTES # BLD AUTO: 20.9 % — SIGNIFICANT CHANGE UP (ref 20.5–51.1)
MCHC RBC-ENTMCNC: 31.6 PG — HIGH (ref 27–31)
MCHC RBC-ENTMCNC: 33.2 G/DL — SIGNIFICANT CHANGE UP (ref 32–37)
MCV RBC AUTO: 95 FL — SIGNIFICANT CHANGE UP (ref 81–99)
MONOCYTES # BLD AUTO: 0.88 K/UL — HIGH (ref 0.1–0.6)
MONOCYTES NFR BLD AUTO: 7.4 % — SIGNIFICANT CHANGE UP (ref 1.7–9.3)
NEUTROPHILS # BLD AUTO: 8.29 K/UL — HIGH (ref 1.4–6.5)
NEUTROPHILS NFR BLD AUTO: 70.1 % — SIGNIFICANT CHANGE UP (ref 42.2–75.2)
NRBC # BLD: 0 /100 WBCS — SIGNIFICANT CHANGE UP (ref 0–0)
PLATELET # BLD AUTO: 119 K/UL — LOW (ref 130–400)
RBC # BLD: 3.2 M/UL — LOW (ref 4.2–5.4)
RBC # FLD: 14.5 % — SIGNIFICANT CHANGE UP (ref 11.5–14.5)
WBC # BLD: 11.83 K/UL — HIGH (ref 4.8–10.8)
WBC # FLD AUTO: 11.83 K/UL — HIGH (ref 4.8–10.8)

## 2022-07-08 PROCEDURE — 99238 HOSP IP/OBS DSCHRG MGMT 30/<: CPT

## 2022-07-08 RX ORDER — DOCUSATE SODIUM 100 MG
1 CAPSULE ORAL
Qty: 30 | Refills: 0
Start: 2022-07-08 | End: 2022-08-06

## 2022-07-08 RX ORDER — IBUPROFEN 200 MG
1 TABLET ORAL
Qty: 0 | Refills: 0 | DISCHARGE
Start: 2022-07-08

## 2022-07-08 RX ORDER — ACETAMINOPHEN 500 MG
3 TABLET ORAL
Qty: 0 | Refills: 0 | DISCHARGE
Start: 2022-07-08

## 2022-07-08 RX ORDER — FERROUS SULFATE 325(65) MG
1 TABLET ORAL
Qty: 90 | Refills: 0
Start: 2022-07-08 | End: 2022-08-06

## 2022-07-08 RX ORDER — FERROUS SULFATE 325(65) MG
1 TABLET ORAL
Qty: 42 | Refills: 0
Start: 2022-07-08 | End: 2022-07-21

## 2022-07-08 RX ORDER — ALBUTEROL 90 UG/1
2 AEROSOL, METERED ORAL
Qty: 0 | Refills: 0 | DISCHARGE

## 2022-07-08 RX ORDER — SENNA PLUS 8.6 MG/1
1 TABLET ORAL
Qty: 14 | Refills: 0
Start: 2022-07-08 | End: 2022-07-21

## 2022-07-08 RX ADMIN — Medication 600 MILLIGRAM(S): at 00:20

## 2022-07-08 RX ADMIN — Medication 600 MILLIGRAM(S): at 12:53

## 2022-07-08 RX ADMIN — Medication 600 MILLIGRAM(S): at 06:32

## 2022-07-08 RX ADMIN — Medication 1 TABLET(S): at 12:24

## 2022-07-08 RX ADMIN — Medication 600 MILLIGRAM(S): at 12:23

## 2022-07-08 RX ADMIN — Medication 975 MILLIGRAM(S): at 08:22

## 2022-07-08 RX ADMIN — SODIUM CHLORIDE 3 MILLILITER(S): 9 INJECTION INTRAMUSCULAR; INTRAVENOUS; SUBCUTANEOUS at 05:52

## 2022-07-08 RX ADMIN — Medication 600 MILLIGRAM(S): at 01:29

## 2022-07-08 RX ADMIN — Medication 600 MILLIGRAM(S): at 05:52

## 2022-07-08 NOTE — PROGRESS NOTE ADULT - ASSESSMENT
A/P:  27y yo s/p  PPD1, s/p manual extraction of placenta, recovering well    -encourage ambulation  -regular diet  -encourage PO hydration  -pain management prn   -f/u AM CBC  -post partum and blood pressure precautions discussed  -follow up with provider in 6 weeks    Dr. Martines and Dr. Estrada to be made aware   A/P:  27y yo s/p  PPD1, s/p manual extraction of placenta, preeclampsia without severe features, recovering well    -encourage ambulation  -regular diet  -encourage PO hydration  -pain management prn   -f/u AM CBC  -VNS ordered for home BP  -Rhogam for RH Neg  -post partum and blood pressure precautions discussed  -follow up with provider in 6 weeks    Dr. Martines and Dr. Estrada to be made aware

## 2022-07-08 NOTE — DISCHARGE NOTE OB - CARE PROVIDER_API CALL
Chaya Watkins)  Obstetrics and Gynecology  [address] [place] [address]  Phone: [phone]  Fax: [phone]  Follow Up Time: 1 week

## 2022-07-08 NOTE — DISCHARGE NOTE OB - HOSPITAL COURSE
Patient is s/p  complicated by preeclampsia without severe features and manual extraction of placenta. She had an uncomplicated postpartum course and has met her postpartum milestones appropriately.  Vitals are stable for discharge.

## 2022-07-08 NOTE — PROGRESS NOTE ADULT - SUBJECTIVE AND OBJECTIVE BOX
Patient seen at bedside, resting comfortably. No complaints at this time.    T(C): 37.3 (22 @ 07:05), Max: 37.3 (22 @ 07:05)  HR: 87 (22 @ 07:23) (67 - 126)  BP: 141/83 (22 @ 07:23) (115/72 - 175/99)  RR: 18 (22 @ 07:05) (16 - 18)  SpO2: 98% (22 @ 07:23) (75% - 100%)    EFM: 140 bpm/mod/+accels  TOCO: q 2 mins  SVE: 10/100/+2    Meds:  ALBUTerol    90 MICROgram(s) HFA Inhaler 2 Puff(s) Inhalation every 6 hours PRN  ampicillin  IVPB 1 Gram(s) IV Intermittent every 4 hours  dexAMETHasone  Injectable 4 milliGRAM(s) IV Push every 6 hours PRN  fentanyl (2 MICROgram(s)/mL) + bupivacaine 0.0625%  in 0.9% Sodium Chloride PCEA 250 milliLiter(s) Epidural PCA Continuous  lactated ringers. 1000 milliLiter(s) IV Continuous <Continuous>  loratadine 10 milliGRAM(s) Oral daily  montelukast 10 milliGRAM(s) Oral daily  naloxone Injectable 0.1 milliGRAM(s) IV Push every 3 minutes PRN  ondansetron Injectable 4 milliGRAM(s) IV Push every 6 hours PRN  oxytocin Infusion 333.333 milliUNIT(s)/Min IV Continuous <Continuous>  oxytocin Infusion.  milliUNIT(s)/Min IV Continuous <Continuous>      Labs:  none new    A/P: +  27y  at 39w6d GA, Rh neg (s/p RhoGAM on 2022), GBS pos; pre-eclampsia without severe features with intermittently elevated severe range blood pressures; in labor    Plan:  Continue EFM/TOCO  Continue IV hydration  Continue Clear Liquid diet  Continue Pitocin  Epidural in Place  Pain management PRN    Dr. Natalie lindsey
PGY 1 Post Partum note    Subjective: Patient seen and examined at bedside.  Reports some abdominal pain, improved by tylenol and motrin. Reports heavy vaginal bleeding yesterday but improvement overnight.  Ambulating, tolerating PO, voiding, + flatus.         Physical exam:    Vital Signs Last 24 Hrs  T(C): 36.3 (08 Jul 2022 05:42), Max: 37.3 (07 Jul 2022 07:05)  T(F): 97.3 (08 Jul 2022 05:42), Max: 99.14 (07 Jul 2022 07:05)  HR: 83 (08 Jul 2022 05:42) (77 - 158)  BP: 121/82 (08 Jul 2022 05:42) (116/59 - 153/95)  RR: 16 (08 Jul 2022 05:42) (16 - 18)  SpO2: 96% (07 Jul 2022 10:53) (93% - 100%)      Gen: NAD  Abdomen: nondistended, soft, nontender, firm uterine fundus below of the umbilicus  Pelvic: Minima lochia on pad today  Ext: No calf tenderness, no LE swelling      Meds:   MEDICATIONS  (STANDING):  acetaminophen     Tablet .. 975 milliGRAM(s) Oral <User Schedule>  diphtheria/tetanus/pertussis (acellular) Vaccine (ADAcel) 0.5 milliLiter(s) IntraMuscular once  ibuprofen  Tablet. 600 milliGRAM(s) Oral every 6 hours  oxytocin Infusion 333.333 milliUNIT(s)/Min (1000 mL/Hr) IV Continuous <Continuous>  prenatal multivitamin 1 Tablet(s) Oral daily  sodium chloride 0.9% lock flush 3 milliLiter(s) IV Push every 8 hours    MEDICATIONS  (PRN):  benzocaine 20%/menthol 0.5% Spray 1 Spray(s) Topical every 6 hours PRN for Perineal discomfort  dibucaine 1% Ointment 1 Application(s) Topical every 6 hours PRN Perineal discomfort  diphenhydrAMINE 25 milliGRAM(s) Oral every 6 hours PRN Pruritus  hydrocortisone 1% Cream 1 Application(s) Topical every 6 hours PRN Moderate Pain (4-6)  lanolin Ointment 1 Application(s) Topical every 6 hours PRN nipple soreness  magnesium hydroxide Suspension 30 milliLiter(s) Oral two times a day PRN Constipation  oxyCODONE    IR 5 milliGRAM(s) Oral every 3 hours PRN Moderate to Severe Pain (4-10)  oxyCODONE    IR 5 milliGRAM(s) Oral once PRN Moderate to Severe Pain (4-10)  pramoxine 1%/zinc 5% Cream 1 Application(s) Topical every 4 hours PRN Moderate Pain (4-6)  simethicone 80 milliGRAM(s) Chew every 4 hours PRN Gas  witch hazel Pads 1 Application(s) Topical every 4 hours PRN Perineal discomfort        Diet: regular    LABS:                        9.5    12.04 )-----------( 122      ( 07 Jul 2022 22:35 )             28.2                         13.5   5.29  )-----------( 154      ( 06 Jul 2022 07:07 )             40.0       07-06-22 @ 07:07      136  |  102  |  12  ----------------------------<  78  4.3   |  20  |  0.5<L>        Ca    9.4      06 Jul 2022 07:07    TPro  6.4  /  Alb  3.9  /  TBili  <0.2  /  DBili  x   /  AST  25  /  ALT  20  /  AlkPhos  178<H>  07-06-22 @ 07:07        Allergies    No Known Allergies    Intolerances      
PGY4 Note    Patient seen at bedside after complaining of increased rectal pressure and pain with contractions.     T(F): 98.78 (07-07 @ 01:32), Max: 98.78 (07-07 @ 01:32)  HR: 106 (07-07 @ 03:02)  BP: 147/100 (07-07 @ 03:06) (115/72 - 175/99)  RR: 18 (07-06 @ 18:01)    EFM: 130bpm/moderate/+accels  TOCO: q2mins  SVE: 8/100/+1    Medications:  ampicillin  IVPB: 108 (07-07 @ 00:31),  108 (07-06 @ 20:36),  108 (07-06 @ 16:15),  108 (07-06 @ 12:15)  lactated ringers.: 125 (07-06 @ 15:25),  125 (07-06 @ 07:51),  125 (07-06 @ 07:03)  loratadine: 10 (07-06 @ 12:09)  montelukast: 10 (07-06 @ 12:09)  oxytocin Infusion.: 2 (07-06 @ 10:29)      Labs:                        13.5   5.29  )-----------( 154      ( 06 Jul 2022 07:07 )             40.0     07-06    136  |  102  |  12  ----------------------------<  78  4.3   |  20  |  0.5<L>    Ca    9.4      06 Jul 2022 07:07    TPro  6.4  /  Alb  3.9  /  TBili  <0.2  /  DBili  x   /  AST  25  /  ALT  20  /  AlkPhos  178<H>  07-06    Prenatal Syphilis Test: Nonreact (07-06 @ 07:07)  Uric Acid, Serum: 5.2 mg/dL (07-06 @ 07:07)  Antibody Screen: POS (07-06-22 @ 08:13)  Antibody Interpretation 1: Anti-D due to RhIG Admin (07-06-22 @ 08:13)          
PGY1 Note    Patient seen at bedside for evaluation of labor progression, doing well, no complaints.     T(F): 97.88 (07-06-22 @ 13:02), Max: 98.4 (07-06-22 @ 06:39)  HR: 83 (07-06-22 @ 15:51) (70 - 105)  BP: 141/95 (07-06-22 @ 15:51) (115/72 - 155/103)  RR: 18 (07-06-22 @ 13:02) (16 - 18)  SpO2: 99% (07-06-22 @ 15:54) (97% - 99%)    EFM:135bpm/moderate/pos accels/isolated early decel  TOCO:Q1-2Min  SVE:5/90/-1 @ 4PM by Dr. Martines    Medications:  ampicillin  IVPB: 200 mL/Hr (07-06-22 @ 08:15)  ampicillin  IVPB: 108 mL/Hr (07-06-22 @ 12:15)  lactated ringers.: 125 mL/Hr (07-06-22 @ 07:51),  125 mL/Hr (07-06-22 @ 07:03)  loratadine: 10 milliGRAM(s) (07-06-22 @ 12:09)  montelukast: 10 milliGRAM(s) (07-06-22 @ 12:09)  oxytocin Infusion.: 2 mL/Hr (07-06-22 @ 10:29) @8mu/min      Labs:                        13.5   5.29  )-----------( 154      ( 06 Jul 2022 07:07 )             40.0     07-06    136  |  102  |  12  ----------------------------<  78  4.3   |  20  |  0.5<L>    Ca    9.4      06 Jul 2022 07:07    TPro  6.4  /  Alb  3.9  /  TBili  <0.2  /  DBili  x   /  AST  25  /  ALT  20  /  AlkPhos  178<H>  07-06    ABO RH Interpretation: B NEG (07-06-22 @ 08:13)    Antibody Screen: POS (07-06-22 @ 08:13)      L&amp;D Drug Screen, Urine: Done (07-06-22 @ 07:07)    Prenatal Syphilis Test: Nonreact (07-06-22 @ 07:07)    Uric Acid, Serum: 5.2 mg/dL (07-06-22 @ 07:07)    Lactate Dehydrogenase, Serum: 220 (07-06-22 @ 07:07)    Protein/Creatinine Ratio Calculation: 0.3 Ratio (07-06-22 @ 07:07)        
PGY2 Note    S: Patient seen and examined at bedside. Doing well, pain increasing, desires epidural    Vital Signs Last 24 Hrs  T(C): 36.8 (06 Jul 2022 07:40), Max: 36.9 (06 Jul 2022 06:39)  T(F): 98.2 (06 Jul 2022 07:40), Max: 98.4 (06 Jul 2022 06:39)  HR: 90 (06 Jul 2022 12:40) (74 - 105)  BP: 144/101 (06 Jul 2022 12:40) (128/89 - 155/103)  RR: 16 (06 Jul 2022 07:40) (16 - 18)    EFM: 150/mod eloy/pos accel  TOCO: q2-3 min  SVE:  2/80/-2 SROM bloody/meconium    Labs:                        13.5   5.29  )-----------( 154      ( 06 Jul 2022 07:07 )             40.0       07-06    136  |  102  |  12  ----------------------------<  78  4.3   |  20  |  0.5<L>    Ca    9.4      06 Jul 2022 07:07    TPro  6.4  /  Alb  3.9  /  TBili  <0.2  /  DBili  x   /  AST  25  /  ALT  20  /  AlkPhos  178<H>  07-06    ABO RH Interpretation: B NEG (07-06-22 @ 08:13)    Prenatal Syphilis Test: Nonreact (07-06-22 @ 07:07)    Pitocin @1100 on 8 mu/min  
PGY3 Note    Patient seen at bedside. No complaints at the moment.    T(F): 98.42 (07-06 @ 22:41), Max: 98.42 (07-06 @ 15:56)  HR: 82 (07-06 @ 23:27)  BP: 158/99 (07-06 @ 23:22) (115/72 - 175/99)  RR: 18 (07-06 @ 18:01)    EFM: 130bpm/moderate variability/+accels   TOCO: q2mins  SVE: 8/100/+1    Medications:  ampicillin  IVPB: 108 (07-06 @ 20:36),  108 (07-06 @ 16:15),  108 (07-06 @ 12:15)  lactated ringers.: 125 (07-06 @ 15:25),  125 (07-06 @ 07:51),  125 (07-06 @ 07:03)  loratadine: 10 (07-06 @ 12:09)  montelukast: 10 (07-06 @ 12:09)  oxytocin Infusion.: 2 (07-06 @ 10:29)      Labs:                        13.5   5.29  )-----------( 154      ( 06 Jul 2022 07:07 )             40.0     07-06    136  |  102  |  12  ----------------------------<  78  4.3   |  20  |  0.5<L>    Ca    9.4      06 Jul 2022 07:07    TPro  6.4  /  Alb  3.9  /  TBili  <0.2  /  DBili  x   /  AST  25  /  ALT  20  /  AlkPhos  178<H>  07-06    Prenatal Syphilis Test: Nonreact (07-06 @ 07:07)  Uric Acid, Serum: 5.2 mg/dL (07-06 @ 07:07)  Antibody Screen: POS (07-06-22 @ 08:13)  Antibody Interpretation 1: Anti-D due to RhIG Admin (07-06-22 @ 08:13)

## 2022-07-08 NOTE — PROGRESS NOTE ADULT - ATTENDING COMMENTS
Patient seen, case discussed with resident  PPD#2  doing well.  Mild preeclampsia - BPs are now normal. VNS for BP checks at home.    Acute blood loss anemia. Stable H/H, asymptomatic.    Stable for d/c home.   F/u 6 weeks for postpartum visit.  Precautions discussed.

## 2022-07-08 NOTE — DISCHARGE NOTE OB - PATIENT PORTAL LINK FT
You can access the FollowMyHealth Patient Portal offered by [facility] by registering at the following website: [email or web address]. By joining [facility]’s FollowMyHealth portal, you will also be able to view your health information using other applications (apps) compatible with our system.

## 2022-07-08 NOTE — DISCHARGE NOTE OB - NS MD DC FALL RISK RISK
For information on Fall & Injury Prevention, visit: [email or web address] OR  [email or web address] OR  https://www.cdc.gov/steadi/patient.html

## 2022-07-12 DIAGNOSIS — Z67.21 TYPE B BLOOD, RH NEGATIVE: ICD-10-CM

## 2022-07-12 DIAGNOSIS — K80.20 CALCULUS OF GALLBLADDER WITHOUT CHOLECYSTITIS WITHOUT OBSTRUCTION: ICD-10-CM

## 2022-07-12 DIAGNOSIS — O26.893 OTHER SPECIFIED PREGNANCY RELATED CONDITIONS, THIRD TRIMESTER: ICD-10-CM

## 2022-07-12 DIAGNOSIS — D62 ACUTE POSTHEMORRHAGIC ANEMIA: ICD-10-CM

## 2022-07-12 DIAGNOSIS — Z3A.39 39 WEEKS GESTATION OF PREGNANCY: ICD-10-CM

## 2022-07-12 LAB
ALFENTANIL UR QUANT: SIGNIFICANT CHANGE UP NG/MG CREAT
CREAT UR-MCNC: 139 MG/DL — SIGNIFICANT CHANGE UP
FENTANYL UR CFM-MCNC: NEGATIVE — SIGNIFICANT CHANGE UP
FENTANYL UR CFM-MCNC: SIGNIFICANT CHANGE UP NG/MG CREAT
NORFENTANYL UR-MCNC: SIGNIFICANT CHANGE UP NG/MG CREAT
SUFENTANIL UR QUANT: SIGNIFICANT CHANGE UP NG/MG CREAT
SURGICAL PATHOLOGY STUDY: SIGNIFICANT CHANGE UP

## 2022-08-26 ENCOUNTER — OUTPATIENT (OUTPATIENT)
Dept: OUTPATIENT SERVICES | Facility: HOSPITAL | Age: 27
LOS: 1 days | Discharge: HOME | End: 2022-08-26

## 2022-08-26 ENCOUNTER — APPOINTMENT (OUTPATIENT)
Dept: OBGYN | Facility: CLINIC | Age: 27
End: 2022-08-26

## 2022-08-26 ENCOUNTER — EMERGENCY (EMERGENCY)
Facility: HOSPITAL | Age: 27
LOS: 0 days | Discharge: HOME | End: 2022-08-26
Attending: EMERGENCY MEDICINE | Admitting: EMERGENCY MEDICINE

## 2022-08-26 VITALS
HEIGHT: 65 IN | WEIGHT: 169.98 LBS | TEMPERATURE: 99 F | RESPIRATION RATE: 18 BRPM | HEART RATE: 88 BPM | SYSTOLIC BLOOD PRESSURE: 146 MMHG | OXYGEN SATURATION: 99 % | DIASTOLIC BLOOD PRESSURE: 92 MMHG

## 2022-08-26 DIAGNOSIS — H92.02 OTALGIA, LEFT EAR: ICD-10-CM

## 2022-08-26 DIAGNOSIS — Z98.890 OTHER SPECIFIED POSTPROCEDURAL STATES: Chronic | ICD-10-CM

## 2022-08-26 DIAGNOSIS — H60.92 UNSPECIFIED OTITIS EXTERNA, LEFT EAR: ICD-10-CM

## 2022-08-26 DIAGNOSIS — Z87.19 PERSONAL HISTORY OF OTHER DISEASES OF THE DIGESTIVE SYSTEM: ICD-10-CM

## 2022-08-26 DIAGNOSIS — J45.909 UNSPECIFIED ASTHMA, UNCOMPLICATED: ICD-10-CM

## 2022-08-26 PROCEDURE — 99053 MED SERV 10PM-8AM 24 HR FAC: CPT

## 2022-08-26 PROCEDURE — 99283 EMERGENCY DEPT VISIT LOW MDM: CPT

## 2022-08-26 PROCEDURE — 99214 OFFICE O/P EST MOD 30 MIN: CPT

## 2022-08-26 RX ORDER — IBUPROFEN 200 MG
600 TABLET ORAL ONCE
Refills: 0 | Status: COMPLETED | OUTPATIENT
Start: 2022-08-26 | End: 2022-08-26

## 2022-08-26 RX ADMIN — Medication 1 TABLET(S): at 02:04

## 2022-08-26 RX ADMIN — Medication 600 MILLIGRAM(S): at 02:04

## 2022-08-26 NOTE — ED PROVIDER NOTE - NS ED ATTENDING STATEMENT MOD
This was a shared visit with the DIEGO. I reviewed and verified the documentation and independently performed the documented:

## 2022-08-26 NOTE — ED PROVIDER NOTE - PATIENT PORTAL LINK FT
You can access the FollowMyHealth Patient Portal offered by [facility] by registering at the following website: [email or web address]. By joining [facility]’s FollowMyHealth portal, you will also be able to view your health information using other applications (apps) compatible with our system.

## 2022-08-26 NOTE — ED PROVIDER NOTE - NS ED ROS FT
Constitutional:  no fevers, no chills, no malaise  Eyes:  No visual changes  ENMT: +ear pain  Cardiac:  No chest pain  Respiratory:  No cough or sob  GI:  No nausea, vomiting, diarrhea or abdominal pain.  :  No dysuria, frequency or burning.  MS:  No back pain, no joint pain.  Neuro:  No headache, no dizziness, no change in mental status  Skin:  No skin rash  Except as documented in the HPI,  all other systems are negative

## 2022-08-26 NOTE — ED ADULT NURSE NOTE - NSIMPLEMENTINTERV_GEN_ALL_ED
Implemented All Universal Safety Interventions:  [place] to call system. Call bell, personal items and telephone within reach. Instruct patient to call for assistance. Room bathroom lighting operational. Non-slip footwear when patient is off stretcher. Physically safe environment: no spills, clutter or unnecessary equipment. Stretcher in lowest position, wheels locked, appropriate side rails in place.

## 2022-08-26 NOTE — ED ADULT TRIAGE NOTE - HISTORY OF COVID-19 VACCINATION
Department of Neurology  Movement Disorders Division   Initial Clinic Evaluation     Patient: Rey Leslie   MRN: [id number]   : 1948   Date of Visit: 2022     CC: Cervical dystonia     History of Present Illness:  Ms. Leslie is a 73 year old woman with PMH significant for severe scoliosis of the spine with longitudinal involvement and complications including cervical dystonia. She presents to the Movement disorder clinic for re-evaluation of her dystonia.     She sees a PMR physician at [facility] (Dr. Lara) for quite a long time for this dystonia. She was referred to [facility], but the provider left and she was never seen by [facility] PMR physicians.    Seen today for re-evaluation of dystonia. She was diagnosed about 15-20 years ago with cervical dystonia in the setting of her severe scoliosis. She attempts a sensory trick of touching the chin that helps quite a bit. She notices turning head to the Right results in much more stiffness with little pain.     She gets Botox injections with Dr. Lara and was last seen on 11/10/21. During injections, does occasionally get variations in injections due to slight dynamic nature of the symptoms. She usually gets injections every 12 weeks. Historically gets ~550u, but recently got 400u and noticed no difference. In fact, noticed almost no difference at the last injection and may not notice really any relief from the day prior to injection compared to peak dose. She is having pain in the back of the neck with even minimal sustained activities such as baking and looking at the cooking sheet for a couple of minute during prep. She has a tendency to look left and compensates during relaxation. Finally, she has shakiness of the head, usually in horizontal plane    Movement Disorder-related Medications                   [id number]  [id number]     Clonazepam 0.5mg tablet 1  1                             - Has not tried other medications for this    Review of  Systems:  Other than that noted at the end of this note, the remainder of 12 systems reviewed were negative.    Medications:  Current Outpatient Medications   Medication Sig Dispense Refill     biotin 1000 MCG TABS tablet Take 1,000 mcg by mouth daily       Calcium Carbonate-Vitamin D (CALCIUM + D PO) Take 1 tablet by mouth daily       CLONAZEPAM PO Take 1-2 mg by mouth At Bedtime       LEVOTHYROXINE SODIUM PO Take 75 mcg by mouth daily       METOPROLOL TARTRATE PO Take 25 mg by mouth daily       WARFARIN SODIUM PO 10mg M and Th, 5mg for 5 days          Allergies: is allergic to oxycodone-aspirin and sulfa drugs.    Past Medical History:   No past medical history on file.    Past Surgical History:   Past Surgical History:   Procedure Laterality Date     LAMINECTOMY  15 yrs ago     [place] ESOPHAGOGASTRODUODENOSCOPY TRANSORAL DIAGNOSTIC N/A 11/6/2019    Procedure: ESOPHAGOGASTRODUODENOSCOPY (EGD);  Surgeon: Wallace Cooper MD;  Location: [facility];  Service: Gastroenterology     SHOULDER ARTHROSCOPY W/ ACROMIAL REPAIR Bilateral 10 yrs ago       Social History:   Social History     Socioeconomic History     Marital status:      Spouse name: None     Number of children: None     Years of education: None     Highest education level: None   Occupational History     None   Tobacco Use     Smoking status: Former Smoker     Packs/day: 0.00     Smokeless tobacco: Never Used     Tobacco comment: quit 41 years ago   Substance and Sexual Activity     Alcohol use: None     Drug use: None     Sexual activity: None   Other Topics Concern     None   Social History Narrative     None     Social Determinants of Health     Financial Resource Strain: Not on file   Food Insecurity: Not on file   Transportation Needs: Not on file   Physical Activity: Not on file   Stress: Not on file   Social Connections: Not on file   Intimate Partner Violence: Not on file   Housing Stability: Not on file       Family History:  Family History  "  Problem Relation Age of Onset     Hypertension Mother      Coronary Artery Disease Father      Cancer Sister      No Known Problems Brother      No Known Problems Maternal Aunt      No Known Problems Maternal Uncle      No Known Problems Paternal Aunt      No Known Problems Paternal Uncle      No Known Problems Maternal Grandmother      No Known Problems Maternal Grandfather      No Known Problems Paternal Grandmother      No Known Problems Paternal Grandfather           Physical Exam:  The patient's  blood pressure is 120/66 and her pulse is 76.    Neurological Examination:   HEENT: Twisting to the Left; Right shoulder elevation and tilting with some torticollis on the Right side; Asymmetric bulk of the Left cervical rotators and some asymmetry of the Right sided shoulder elevators and contralateral rotators  Neurological  Mental status: Awake, attentive, interactive; Recalls remote and recent history with accuracy  Speech/Language: Fluent speech without paraphasic errors; No dysarthria  Cranial nerves: Visual fields intact to confrontation, Eyes conjugate, Pupils 4mm and brisk, EOMI w/ normal and smooth pursuit, face expression symmetric, facial sensation intact and symmetric, hearing intact to conversation, shoulder shrug strong but asymmetric on the Right side, palate rise symmetric, tongue/uvula midline.  Motor:   Bulk: Asymmetric bulk in the cervical musculature, especially with rotators   Tone: Increased in the cervical musculature   Spontaneous movements: Mild horizontal \"No-no\" tremor with dystonic components of erratic and inconsistent amplitudes   Power: Appropriate 5/5 in all extremities  Reflexes: 2 and symmetric biceps, triceps, brachioradialis, patellar, and achilles. No crossed adductors or spread. Toes down-going  Sensory: Intact to LT, PP x4E; No lateral extinction   Coordination: FNF intact bilaterally without dysmetria; Normal heel-shin test bilaterally  Finger tapping with normal amplitude and " frequency; Rolling hands normal rate and coordinated  Gait:  Deferred      Data Reviewed:   XR's of Scoliosis  Botox injection patterns from 11/10/21      Impression:    Rey Leslie is a 73 year old woman with PMH significant for severe scoliosis of the spine with longitudinal involvement and complications including cervical dystonia. She presents to the Movement disorder clinic for re-evaluation of her dystonia. She may have developed some resistance to Botox based on history and should be evaluated for. She is also wondering about the efficacy of Clonazepam and is wondering about a wean.     Recommendations:   1. Attempt frontalis test with PMR physician  - Inject unilaterally in frontalis muscle and have patient take a picture at 3-4 weeks to assess for asymmetry (preferably with frontalis activation)  - This can assess for Onabotulinum toxin A resistance if the asymmetry is not appreciated   - If resistance is present, consider Onabotulinum toxin B (Myobloc)    2. Continue consistent physical exercise  - Also consider PT for the cervical dystonia and limited cervical movements     3. Patient would like to attempt weaning Clonazepam to assess for efficacy  - Wean by 1/2 tablet every 7 days  - Take 1/2 tab in AM and 1 full tab in PM x7 days, then take 1/2 tab twice daily x7 days, then take 1/2 tab in the PM only x7 days, then attempt to stop  - If you develop worsening of symptoms on lower doses, then stop weaning off and keep the current medication regimen  - If we find that Clonazepam is quite effective with minimal side effects in the future, then consider adding a 1/2 tablet or 1 tablet in the afternoon/midday       - Return to movement disorder clinic as needed    Patient seen and discussed with attending Dr. Arnold Fox MD        Yes

## 2022-08-26 NOTE — ED PROVIDER NOTE - CARE PROVIDER_API CALL
Zofia Amador (MD)  Family Medicine  [address] [place] [address]  Phone: [phone]  Fax: ()-  Follow Up Time: 1-3 Days   Zofia Amador)  Family Medicine  [address] [place] [address]  Phone: [phone]  Fax: ()-  Follow Up Time: 1-3 Days    Eliezer Thomas)  Otolaryngology  [address] [place] [address], [address] [place] [address]  Phone: [phone]  Fax: [phone]  Follow Up Time:

## 2022-08-26 NOTE — ED PROVIDER NOTE - ATTENDING APP SHARED VISIT CONTRIBUTION OF CARE
Left ear pain for the 3 to 4 days not improved with Ciprodex or Tylenol Motrin.  Noticing discharge of the left ear on exam there is swelling and edema to the auditory canal with debris noted unable to visualize TM the tragus is tender there is some anterior lymphadenopathy but no mastoid tenderness full range of motion of the neck.  Impression is otitis externa with auditory canal swelling.  Ear wick was placed and antibiotics were given through the ear wick.  Patient to continue Motrin and Tylenol for supportive care patient already has Ciprodex drops

## 2022-08-26 NOTE — ED PROVIDER NOTE - PHYSICAL EXAMINATION
CONSTITUTIONAL: Well-developed; well-nourished; in no acute distress, nontoxic appearing  SKIN: skin exam is warm and dry,  HEAD: Normocephalic; atraumatic.  EYES: PERRL, 3 mm bilateral, no nystagmus, EOM intact; conjunctiva and sclera clear.  ENT: Left tm non visible, swelling and edema to auditory canal with tenderness to the pinna and debris noted in canal. mastoid is without tenderness.  NECK: Supple; non tender.  ROM intact.  NEURO: awake, alert, following commands, oriented, grossly unremarkable. No Focal deficits. GCS 15.

## 2022-08-26 NOTE — ED PROVIDER NOTE - PROVIDER TOKENS
PROVIDER:[TOKEN:[[id number]],FOLLOWUP:[1-3 Days]] PROVIDER:[TOKEN:[[id number]],FOLLOWUP:[1-3 Days]],PROVIDER:[TOKEN:[[id number]]]

## 2022-08-26 NOTE — ED PROVIDER NOTE - CARE PROVIDERS DIRECT ADDRESSES
,DirectAddress_Unknown ,DirectAddress_Unknown,[email or web address]

## 2022-08-26 NOTE — ED PROVIDER NOTE - CLINICAL SUMMARY MEDICAL DECISION MAKING FREE TEXT BOX
Patient with otitis externa that is severe.  No tenderness over the mastoid.  Ear wick was placed in the left ear.  Patient will also be started on oral antibiotics.

## 2022-08-26 NOTE — ED PROVIDER NOTE - NSFOLLOWUPINSTRUCTIONS_ED_ALL_ED_FT
Otitis Externa  Image   Otitis externa is an infection of the outer ear canal. The outer ear canal is the area between the outside of the ear and the eardrum. Otitis externa is sometimes called "swimmer's ear."    Follow these instructions at home:  If you were given antibiotic ear drops, use them as told by your doctor. Do not stop using them even if your condition gets better.  Take over-the-counter and prescription medicines only as told by your doctor.  Keep all follow-up visits as told by your doctor. This is important.  How is this prevented?  Keep your ear dry. Use the corner of a towel to dry your ear after you swim or bathe.  Try not to scratch or put things in your ear. Doing these things makes it easier for germs to grow in your ear.  Avoid swimming in lakes, dirty water, or pools that may not have the right amount of a chemical called chlorine.  Consider making ear drops and putting 3 or 4 drops in each ear after you swim. Ask your doctor about how you can make ear drops.  Contact a doctor if:  You have a fever.  After 3 days your ear is still red, swollen, or painful.  After 3 days you still have pus coming from your ear.  Your redness, swelling, or pain gets worse.  You have a really bad headache.  You have redness, swelling, pain, or tenderness behind your ear.  This information is not intended to replace advice given to you by your health care provider. Make sure you discuss any questions you have with your health care provider.

## 2022-08-26 NOTE — ED PROVIDER NOTE - OBJECTIVE STATEMENT
3-day history of left ear pain that sharp in nature associated with decreased hearing and discharge.  Not improved with Ciprodex.  Patient also took acetaminophen and ibuprofen for relief.  Patient is actively breast-feeding.  No vomiting no headache no fever

## 2022-09-18 NOTE — OB PROVIDER H&P - NS_ABDFINDING_OBGYN_ALL_OB
You can access the FollowMyHealth Patient Portal offered by [facility] by registering at the following website: [email or web address]. By joining [facility]’s FollowMyHealth portal, you will also be able to view your health information using other applications (apps) compatible with our system. Soft, nontender
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
## 2022-12-17 NOTE — ED ADULT TRIAGE NOTE - NSWEIGHTCALCTOOLDRUG_GEN_A_CORE
----- Message from Luis Espino sent at 12/16/2022  4:46 PM EST -----  Subject: Refill Request    QUESTIONS  Name of Medication? cyclobenzaprine (FLEXERIL) 10 MG tablet  Patient-reported dosage and instructions? 3 per day   How many days do you have left? 0  Preferred Pharmacy? [id number]  Pharmacy phone number (if available)? [phone]  ---------------------------------------------------------------------------  --------------  Crispin GUARDADO  What is the best way for the office to contact you? OK to leave message on   voicemail  Preferred Call Back Phone Number? [phone]  ---------------------------------------------------------------------------  --------------  SCRIPT ANSWERS  Relationship to Patient?  Self  used

## 2023-05-03 NOTE — OB RN TRIAGE NOTE - NS_VISITREASON1_OBGYN_ALL_OB
----- Message from Tonny Nagel MA sent at 5/2/2023  4:21 PM CDT -----  Contact: Patient, [phone]  Pt stated  she needed to know what to do about her BP being high she stated she went to the ER for Hypertension crisis . Pt stated do you want her to double up on BP meds . Pt stated you can cancel the Rx you sent to the pharmacy because she has an appointment with physic tomorrow .   ----- Message -----  From: Christa Santoro MD  Sent: 5/2/2023   4:00 PM CDT  To: Tonny Nagel MA    Rx for Vistaril sent.  ----- Message -----  From: Tonny Nagel MA  Sent: 5/2/2023   3:39 PM CDT  To: Christa Santoro MD      ----- Message -----  From: Aracelis Mcpherson  Sent: 5/1/2023   9:22 AM CDT  To: Yvan PEREZ Staff    Calling to speak with the nurse regarding she is very depressed today, she lost her sister yesterday.  Wants to know if she can up her medication. Please call her. Thanks.          
Labor at Term

## 2023-09-01 ENCOUNTER — APPOINTMENT (OUTPATIENT)
Dept: OBGYN | Facility: CLINIC | Age: 28
End: 2023-09-01

## 2023-10-09 NOTE — ED ADULT NURSE NOTE - NS ED NOTE ABUSE RESPONSE YN
Medication: amlodipine  Last office visit date: 8/24/23  Next appointment scheduled?: Yes   Number of refills given: 6 months    
Yes

## 2024-01-12 ENCOUNTER — APPOINTMENT (OUTPATIENT)
Dept: OBGYN | Facility: CLINIC | Age: 29
End: 2024-01-12

## 2024-06-28 ENCOUNTER — APPOINTMENT (OUTPATIENT)
Dept: OBGYN | Facility: CLINIC | Age: 29
End: 2024-06-28

## 2024-08-02 ENCOUNTER — APPOINTMENT (OUTPATIENT)
Dept: OTOLARYNGOLOGY | Facility: CLINIC | Age: 29
End: 2024-08-02

## 2024-08-02 VITALS — HEIGHT: 65 IN | WEIGHT: 190 LBS | BODY MASS INDEX: 31.65 KG/M2

## 2024-08-02 DIAGNOSIS — L29.9 PRURITUS, UNSPECIFIED: ICD-10-CM

## 2024-08-02 DIAGNOSIS — H61.23 IMPACTED CERUMEN, BILATERAL: ICD-10-CM

## 2024-08-02 DIAGNOSIS — J30.1 ALLERGIC RHINITIS DUE TO POLLEN: ICD-10-CM

## 2024-08-02 DIAGNOSIS — J34.89 OTHER SPECIFIED DISORDERS OF NOSE AND NASAL SINUSES: ICD-10-CM

## 2024-08-02 PROCEDURE — 69210 REMOVE IMPACTED EAR WAX UNI: CPT

## 2024-08-02 PROCEDURE — 31237 NSL/SINS NDSC SURG BX POLYPC: CPT | Mod: 50

## 2024-08-02 PROCEDURE — 99204 OFFICE O/P NEW MOD 45 MIN: CPT | Mod: 25

## 2024-08-02 RX ORDER — FLUTICASONE PROPIONATE 50 UG/1
50 SPRAY, METERED NASAL
Qty: 1 | Refills: 3 | Status: ACTIVE | COMMUNITY
Start: 2024-08-02 | End: 1900-01-01

## 2024-08-02 RX ORDER — LEVOFLOXACIN 500 MG/1
500 TABLET, FILM COATED ORAL
Qty: 5 | Refills: 0 | Status: ACTIVE | COMMUNITY
Start: 2024-08-02 | End: 1900-01-01

## 2024-08-02 NOTE — REASON FOR VISIT
[Subsequent Evaluation] : a subsequent evaluation for [FreeTextEntry2] : bilateral ear pain  and muffled hearing ,  Nasal congestion

## 2024-08-02 NOTE — PHYSICAL EXAM
[Normal] : mucosa is normal [Midline] : trachea located in midline position [de-identified] : B/L ear wax removed wtih curette

## 2024-08-02 NOTE — PHYSICAL EXAM
[Normal] : mucosa is normal [Midline] : trachea located in midline position [de-identified] : B/L ear wax removed wtih curette

## 2024-08-02 NOTE — HISTORY OF PRESENT ILLNESS
[FreeTextEntry1] : Patient presents today for c/o She states Symptoms have been ongoing for, No further complaints.

## 2024-08-02 NOTE — PROCEDURE
[Recalcitrant Symptoms] : recalcitrant symptoms  [Anterior rhinoscopy insufficient to account for symptoms] : anterior rhinoscopy insufficient to account for symptoms [None] : none [Rigid Endoscope] : examined with a rigid endoscope [Severe] : severe [Removed] : which was removed [FreeTextEntry2] : dry scabs seen and purulent discharge

## 2024-08-04 LAB — BACTERIA NOSE AEROBE CULT: NORMAL

## 2024-08-28 ENCOUNTER — APPOINTMENT (OUTPATIENT)
Dept: OTOLARYNGOLOGY | Facility: CLINIC | Age: 29
End: 2024-08-28

## 2024-09-06 ENCOUNTER — APPOINTMENT (OUTPATIENT)
Dept: OBGYN | Facility: CLINIC | Age: 29
End: 2024-09-06
Payer: COMMERCIAL

## 2024-09-06 ENCOUNTER — OUTPATIENT (OUTPATIENT)
Dept: OUTPATIENT SERVICES | Facility: HOSPITAL | Age: 29
LOS: 1 days | End: 2024-09-06
Payer: COMMERCIAL

## 2024-09-06 ENCOUNTER — APPOINTMENT (OUTPATIENT)
Dept: OTOLARYNGOLOGY | Facility: CLINIC | Age: 29
End: 2024-09-06

## 2024-09-06 VITALS — WEIGHT: 190 LBS | BODY MASS INDEX: 31.65 KG/M2 | HEIGHT: 65 IN

## 2024-09-06 VITALS
BODY MASS INDEX: 22.66 KG/M2 | WEIGHT: 136 LBS | SYSTOLIC BLOOD PRESSURE: 110 MMHG | HEIGHT: 65 IN | DIASTOLIC BLOOD PRESSURE: 76 MMHG

## 2024-09-06 DIAGNOSIS — Z01.419 ENCOUNTER FOR GYNECOLOGICAL EXAMINATION (GENERAL) (ROUTINE) WITHOUT ABNORMAL FINDINGS: ICD-10-CM

## 2024-09-06 DIAGNOSIS — L29.9 PRURITUS, UNSPECIFIED: ICD-10-CM

## 2024-09-06 DIAGNOSIS — Z01.419 ENCOUNTER FOR GYNECOLOGICAL EXAMINATION (GENERAL) (ROUTINE) W/OUT ABNORMAL FINDINGS: ICD-10-CM

## 2024-09-06 DIAGNOSIS — J34.89 OTHER SPECIFIED DISORDERS OF NOSE AND NASAL SINUSES: ICD-10-CM

## 2024-09-06 DIAGNOSIS — Z98.890 OTHER SPECIFIED POSTPROCEDURAL STATES: Chronic | ICD-10-CM

## 2024-09-06 DIAGNOSIS — Z98.890 OTHER SPECIFIED POSTPROCEDURAL STATES: ICD-10-CM

## 2024-09-06 PROCEDURE — 88142 CYTOPATH C/V THIN LAYER: CPT

## 2024-09-06 PROCEDURE — 31231 NASAL ENDOSCOPY DX: CPT

## 2024-09-06 PROCEDURE — 99213 OFFICE O/P EST LOW 20 MIN: CPT | Mod: 25

## 2024-09-06 PROCEDURE — 99395 PREV VISIT EST AGE 18-39: CPT

## 2024-09-06 PROCEDURE — 99459 PELVIC EXAMINATION: CPT

## 2024-09-06 RX ORDER — CHLORHEXIDINE GLUCONATE 4 %
325 (65 FE) LIQUID (ML) TOPICAL TWICE DAILY
Qty: 60 | Refills: 5 | Status: ACTIVE | COMMUNITY
Start: 2024-09-06 | End: 1900-01-01

## 2024-09-06 RX ORDER — NORGESTIMATE AND ETHINYL ESTRADIOL 0.25-0.035
0.25-35 KIT ORAL
Qty: 3 | Refills: 0 | Status: ACTIVE | COMMUNITY
Start: 2024-09-06 | End: 1900-01-01

## 2024-09-06 NOTE — ASSESSMENT
[FreeTextEntry1] : Debridement done.  Patient still has symptoms after steroids and abx. Will likely need sinus surgery.  Continue flonase.  RTC after CT.

## 2024-09-06 NOTE — HISTORY OF PRESENT ILLNESS
[Facial Pain] : facial pain [Headache] : headache [Clear Rhinorrhea] : clear rhinorrhea [Facial Pressure] : facial pressure [Retro-Orbital Pain] : retro-orbital pain [Nasal Congestion] : nasal congestion [Postnasal Drainage] : postnasal drainage [Ear Pressure] : ear pressure [Sore Throat] : sore throat [FreeTextEntry1] : Patient returns today on nasal obstruction, allergic rhinitis , ear itch, Rhinorrhea .  She took antibiotics (levofloxacin)  as prescribed with no improvement with symptoms. Continues to have muffled hearing in both ears and itching in both ear. States that pain in her ears is better. Developed left sided facial pain and pressure. Feels nasal obstruction of left nostril.  [Purulent Rhinorrhea] : no purulent rhinorrhea [Dental Pain] : no dental pain [Ear Pain] : no ear pain [Periorbital Redness] : no periorbital redness [Cough] : no cough [Ear Fullness] : no ear fullness

## 2024-09-06 NOTE — REASON FOR VISIT
[Subsequent Evaluation] : a subsequent evaluation for [FreeTextEntry2] : nasal obstruction, allergic rhinitis , ear itch, Rhinorrhea

## 2024-09-06 NOTE — PROCEDURE
[Osteomeatal Pathology] : osteomeatal pathology [Rigid Endoscope] : examined with a rigid endoscope [Congested] : congested [Arti] : arti [Normal] : the septum showed no abnormalities [FreeTextEntry4] : left sided thick mucus +++

## 2024-09-09 ENCOUNTER — OUTPATIENT (OUTPATIENT)
Dept: OUTPATIENT SERVICES | Facility: HOSPITAL | Age: 29
LOS: 1 days | End: 2024-09-09

## 2024-09-09 DIAGNOSIS — Z98.890 OTHER SPECIFIED POSTPROCEDURAL STATES: Chronic | ICD-10-CM

## 2024-09-09 DIAGNOSIS — Z00.00 ENCOUNTER FOR GENERAL ADULT MEDICAL EXAMINATION WITHOUT ABNORMAL FINDINGS: ICD-10-CM

## 2024-09-10 DIAGNOSIS — Z00.00 ENCOUNTER FOR GENERAL ADULT MEDICAL EXAMINATION WITHOUT ABNORMAL FINDINGS: ICD-10-CM

## 2024-09-12 DIAGNOSIS — Z01.419 ENCOUNTER FOR GYNECOLOGICAL EXAMINATION (GENERAL) (ROUTINE) WITHOUT ABNORMAL FINDINGS: ICD-10-CM

## 2024-09-12 LAB — CYTOLOGY CVX/VAG DOC THIN PREP: NORMAL

## 2024-10-11 ENCOUNTER — APPOINTMENT (OUTPATIENT)
Dept: OBGYN | Facility: CLINIC | Age: 29
End: 2024-10-11

## 2025-01-15 ENCOUNTER — OUTPATIENT (OUTPATIENT)
Dept: OUTPATIENT SERVICES | Facility: HOSPITAL | Age: 30
LOS: 1 days | End: 2025-01-15
Payer: COMMERCIAL

## 2025-01-15 ENCOUNTER — RESULT REVIEW (OUTPATIENT)
Age: 30
End: 2025-01-15

## 2025-01-15 DIAGNOSIS — Z98.890 OTHER SPECIFIED POSTPROCEDURAL STATES: Chronic | ICD-10-CM

## 2025-01-15 DIAGNOSIS — J34.89 OTHER SPECIFIED DISORDERS OF NOSE AND NASAL SINUSES: ICD-10-CM

## 2025-01-15 PROCEDURE — 70486 CT MAXILLOFACIAL W/O DYE: CPT

## 2025-01-15 PROCEDURE — 70486 CT MAXILLOFACIAL W/O DYE: CPT | Mod: 26

## 2025-01-16 DIAGNOSIS — J34.89 OTHER SPECIFIED DISORDERS OF NOSE AND NASAL SINUSES: ICD-10-CM

## 2025-02-05 ENCOUNTER — APPOINTMENT (OUTPATIENT)
Dept: OTOLARYNGOLOGY | Facility: CLINIC | Age: 30
End: 2025-02-05
Payer: COMMERCIAL

## 2025-02-05 DIAGNOSIS — H61.23 IMPACTED CERUMEN, BILATERAL: ICD-10-CM

## 2025-02-05 DIAGNOSIS — J34.89 OTHER SPECIFIED DISORDERS OF NOSE AND NASAL SINUSES: ICD-10-CM

## 2025-02-05 DIAGNOSIS — Z98.890 OTHER SPECIFIED POSTPROCEDURAL STATES: ICD-10-CM

## 2025-02-05 DIAGNOSIS — L29.9 PRURITUS, UNSPECIFIED: ICD-10-CM

## 2025-02-05 PROCEDURE — 69210 REMOVE IMPACTED EAR WAX UNI: CPT

## 2025-02-05 PROCEDURE — 99214 OFFICE O/P EST MOD 30 MIN: CPT | Mod: 25

## 2025-02-05 PROCEDURE — 31231 NASAL ENDOSCOPY DX: CPT

## 2025-02-05 RX ORDER — SODIUM BICARBONATE, SODIUM CHLORIDE 700; 2300 MG/3G; MG/3G
2300-700 POWDER, FOR SOLUTION NASAL
Qty: 1 | Refills: 0 | Status: ACTIVE | COMMUNITY
Start: 2025-02-05 | End: 1900-01-01

## 2025-03-28 ENCOUNTER — RESULT REVIEW (OUTPATIENT)
Age: 30
End: 2025-03-28

## 2025-03-28 ENCOUNTER — OUTPATIENT (OUTPATIENT)
Dept: OUTPATIENT SERVICES | Facility: HOSPITAL | Age: 30
LOS: 1 days | End: 2025-03-28
Payer: COMMERCIAL

## 2025-03-28 DIAGNOSIS — Z98.890 OTHER SPECIFIED POSTPROCEDURAL STATES: Chronic | ICD-10-CM

## 2025-03-28 DIAGNOSIS — N63.20 UNSPECIFIED LUMP IN THE LEFT BREAST, UNSPECIFIED QUADRANT: ICD-10-CM

## 2025-03-28 PROCEDURE — 76641 ULTRASOUND BREAST COMPLETE: CPT | Mod: LT

## 2025-03-28 PROCEDURE — 76641 ULTRASOUND BREAST COMPLETE: CPT | Mod: 26,LT

## 2025-03-29 DIAGNOSIS — N63.20 UNSPECIFIED LUMP IN THE LEFT BREAST, UNSPECIFIED QUADRANT: ICD-10-CM

## 2025-04-25 ENCOUNTER — LABORATORY RESULT (OUTPATIENT)
Age: 30
End: 2025-04-25

## 2025-04-25 ENCOUNTER — NON-APPOINTMENT (OUTPATIENT)
Age: 30
End: 2025-04-25

## 2025-04-25 ENCOUNTER — APPOINTMENT (OUTPATIENT)
Dept: OBGYN | Facility: CLINIC | Age: 30
End: 2025-04-25
Payer: COMMERCIAL

## 2025-04-25 ENCOUNTER — OUTPATIENT (OUTPATIENT)
Dept: OUTPATIENT SERVICES | Facility: HOSPITAL | Age: 30
LOS: 1 days | End: 2025-04-25
Payer: COMMERCIAL

## 2025-04-25 VITALS
SYSTOLIC BLOOD PRESSURE: 105 MMHG | HEIGHT: 65 IN | WEIGHT: 150 LBS | DIASTOLIC BLOOD PRESSURE: 72 MMHG | BODY MASS INDEX: 24.99 KG/M2

## 2025-04-25 DIAGNOSIS — Z98.890 OTHER SPECIFIED POSTPROCEDURAL STATES: Chronic | ICD-10-CM

## 2025-04-25 DIAGNOSIS — Z34.81 ENCOUNTER FOR SUPERVISION OF OTHER NORMAL PREGNANCY, FIRST TRIMESTER: ICD-10-CM

## 2025-04-25 DIAGNOSIS — Z34.90 ENCOUNTER FOR SUPERVISION OF NORMAL PREGNANCY, UNSPECIFIED, UNSPECIFIED TRIMESTER: ICD-10-CM

## 2025-04-25 PROCEDURE — 76815 OB US LIMITED FETUS(S): CPT | Mod: 26

## 2025-04-25 PROCEDURE — 87661 TRICHOMONAS VAGINALIS AMPLIF: CPT

## 2025-04-25 PROCEDURE — 87591 N.GONORRHOEAE DNA AMP PROB: CPT

## 2025-04-25 PROCEDURE — 99214 OFFICE O/P EST MOD 30 MIN: CPT | Mod: 25

## 2025-04-25 PROCEDURE — 87491 CHLMYD TRACH DNA AMP PROBE: CPT

## 2025-04-25 PROCEDURE — 99214 OFFICE O/P EST MOD 30 MIN: CPT | Mod: 25,26

## 2025-04-25 PROCEDURE — 76815 OB US LIMITED FETUS(S): CPT

## 2025-04-28 ENCOUNTER — NON-APPOINTMENT (OUTPATIENT)
Age: 30
End: 2025-04-28

## 2025-04-28 ENCOUNTER — APPOINTMENT (OUTPATIENT)
Dept: OBGYN | Facility: CLINIC | Age: 30
End: 2025-04-28

## 2025-04-29 ENCOUNTER — OUTPATIENT (OUTPATIENT)
Dept: OUTPATIENT SERVICES | Facility: HOSPITAL | Age: 30
LOS: 1 days | End: 2025-04-29
Payer: COMMERCIAL

## 2025-04-29 DIAGNOSIS — Z34.81 ENCOUNTER FOR SUPERVISION OF OTHER NORMAL PREGNANCY, FIRST TRIMESTER: ICD-10-CM

## 2025-04-29 DIAGNOSIS — Z98.890 OTHER SPECIFIED POSTPROCEDURAL STATES: Chronic | ICD-10-CM

## 2025-04-29 PROCEDURE — 81220 CFTR GENE COM VARIANTS: CPT

## 2025-04-29 PROCEDURE — 87389 HIV-1 AG W/HIV-1&-2 AB AG IA: CPT

## 2025-04-29 PROCEDURE — 81422 FETAL CHRMOML MICRODELTJ: CPT

## 2025-04-29 PROCEDURE — 86900 BLOOD TYPING SEROLOGIC ABO: CPT

## 2025-04-29 PROCEDURE — 86787 VARICELLA-ZOSTER ANTIBODY: CPT

## 2025-04-29 PROCEDURE — 83036 HEMOGLOBIN GLYCOSYLATED A1C: CPT

## 2025-04-29 PROCEDURE — 86850 RBC ANTIBODY SCREEN: CPT

## 2025-04-29 PROCEDURE — 83655 ASSAY OF LEAD: CPT

## 2025-04-29 PROCEDURE — 86803 HEPATITIS C AB TEST: CPT

## 2025-04-29 PROCEDURE — 83020 HEMOGLOBIN ELECTROPHORESIS: CPT

## 2025-04-29 PROCEDURE — 86762 RUBELLA ANTIBODY: CPT

## 2025-04-29 PROCEDURE — 86901 BLOOD TYPING SEROLOGIC RH(D): CPT

## 2025-04-29 PROCEDURE — 87340 HEPATITIS B SURFACE AG IA: CPT

## 2025-04-29 PROCEDURE — 87077 CULTURE AEROBIC IDENTIFY: CPT

## 2025-04-29 PROCEDURE — 85025 COMPLETE CBC W/AUTO DIFF WBC: CPT

## 2025-04-29 PROCEDURE — 81420 FETAL CHRMOML ANEUPLOIDY: CPT

## 2025-04-29 PROCEDURE — 87086 URINE CULTURE/COLONY COUNT: CPT

## 2025-04-29 PROCEDURE — 81329 SMN1 GENE DOS/DELETION ALYS: CPT

## 2025-04-29 PROCEDURE — 86765 RUBEOLA ANTIBODY: CPT

## 2025-04-29 PROCEDURE — 81243 FMR1 GEN ALY DETC ABNL ALLEL: CPT

## 2025-04-29 PROCEDURE — 86780 TREPONEMA PALLIDUM: CPT

## 2025-04-29 PROCEDURE — 83020 HEMOGLOBIN ELECTROPHORESIS: CPT | Mod: 26

## 2025-04-30 DIAGNOSIS — Z34.81 ENCOUNTER FOR SUPERVISION OF OTHER NORMAL PREGNANCY, FIRST TRIMESTER: ICD-10-CM

## 2025-05-04 LAB
ABORH: NORMAL
ANTIBODY SCREEN: NORMAL
AR GENE MUT ANL BLD/T: NORMAL
BACTERIA UR CULT: ABNORMAL
BASOPHILS # BLD AUTO: 0.02 K/UL
BASOPHILS NFR BLD AUTO: 0.4 %
BILIRUB UR QL STRIP: NORMAL
CLARITY UR: CLEAR
COLLECTION METHOD: NORMAL
EOSINOPHIL # BLD AUTO: 0.07 K/UL
EOSINOPHIL NFR BLD AUTO: 1.3 %
ESTIMATED AVERAGE GLUCOSE: 100 MG/DL
FMR1 GENE MUT ANL BLD/T: NORMAL
GLUCOSE UR-MCNC: NORMAL
HBA1C MFR BLD HPLC: 5.1 %
HBV SURFACE AG SERPL QL IA: NONREACTIVE
HCG UR QL: 0.2 EU/DL
HCT VFR BLD CALC: 42.5 %
HCV AB SER QL: NONREACTIVE
HCV S/CO RATIO: 0.05 COI
HGB A MFR BLD: 97 %
HGB A2 MFR BLD: 2.5 %
HGB BLD-MCNC: 13.2 G/DL
HGB F MFR BLD: 0.5 %
HGB FRACT BLD-IMP: NORMAL
HGB UR QL STRIP.AUTO: NORMAL
HIV1+2 AB SPEC QL IA.RAPID: NONREACTIVE
IMM GRANULOCYTES NFR BLD AUTO: 0.2 %
KETONES UR-MCNC: NORMAL
LEAD BLD-MCNC: <1 UG/DL
LEUKOCYTE ESTERASE UR QL STRIP: NORMAL
LYMPHOCYTES # BLD AUTO: 1.82 K/UL
LYMPHOCYTES NFR BLD AUTO: 35 %
MAN DIFF?: NORMAL
MCHC RBC-ENTMCNC: 26.9 PG
MCHC RBC-ENTMCNC: 31.1 G/DL
MCV RBC AUTO: 86.7 FL
MEV IGG FLD QL IA: 10.3 AU/ML
MEV IGG+IGM SER-IMP: NEGATIVE
MONOCYTES # BLD AUTO: 0.48 K/UL
MONOCYTES NFR BLD AUTO: 9.2 %
NEUTROPHILS # BLD AUTO: 2.8 K/UL
NEUTROPHILS NFR BLD AUTO: 53.9 %
NITRITE UR QL STRIP: NORMAL
PH UR STRIP: 6
PLATELET # BLD AUTO: 247 K/UL
PMV BLD AUTO: 0 /100 WBCS
PMV BLD: 12.3 FL
PROT UR STRIP-MCNC: NORMAL
RBC # BLD: 4.9 M/UL
RBC # FLD: 18.2 %
RUBV IGG FLD-ACNC: 11.2 INDEX
RUBV IGG SER-IMP: POSITIVE
SP GR UR STRIP: 1.02
T PALLIDUM AB SER QL IA: NEGATIVE
VZV AB TITR SER: POSITIVE
VZV IGG SER IF-ACNC: 2.23 S/CO
WBC # FLD AUTO: 5.2 K/UL

## 2025-05-15 ENCOUNTER — ASOB RESULT (OUTPATIENT)
Age: 30
End: 2025-05-15

## 2025-05-15 ENCOUNTER — OUTPATIENT (OUTPATIENT)
Dept: OUTPATIENT SERVICES | Facility: HOSPITAL | Age: 30
LOS: 1 days | End: 2025-05-15
Payer: COMMERCIAL

## 2025-05-15 ENCOUNTER — APPOINTMENT (OUTPATIENT)
Dept: ANTEPARTUM | Facility: CLINIC | Age: 30
End: 2025-05-15
Payer: COMMERCIAL

## 2025-05-15 DIAGNOSIS — Z34.90 ENCOUNTER FOR SUPERVISION OF NORMAL PREGNANCY, UNSPECIFIED, UNSPECIFIED TRIMESTER: ICD-10-CM

## 2025-05-15 DIAGNOSIS — Z98.890 OTHER SPECIFIED POSTPROCEDURAL STATES: Chronic | ICD-10-CM

## 2025-05-15 PROCEDURE — 76813 OB US NUCHAL MEAS 1 GEST: CPT

## 2025-05-15 PROCEDURE — 76801 OB US < 14 WKS SINGLE FETUS: CPT | Mod: 26

## 2025-05-15 PROCEDURE — 76801 OB US < 14 WKS SINGLE FETUS: CPT

## 2025-05-15 PROCEDURE — 76813 OB US NUCHAL MEAS 1 GEST: CPT | Mod: 26

## 2025-05-20 DIAGNOSIS — Z36.82 ENCOUNTER FOR ANTENATAL SCREENING FOR NUCHAL TRANSLUCENCY: ICD-10-CM

## 2025-05-20 DIAGNOSIS — Z36.89 ENCOUNTER FOR OTHER SPECIFIED ANTENATAL SCREENING: ICD-10-CM

## 2025-05-20 DIAGNOSIS — Z3A.13 13 WEEKS GESTATION OF PREGNANCY: ICD-10-CM

## 2025-05-22 ENCOUNTER — NON-APPOINTMENT (OUTPATIENT)
Age: 30
End: 2025-05-22

## 2025-05-23 ENCOUNTER — APPOINTMENT (OUTPATIENT)
Dept: OBGYN | Facility: CLINIC | Age: 30
End: 2025-05-23
Payer: COMMERCIAL

## 2025-05-23 ENCOUNTER — OUTPATIENT (OUTPATIENT)
Dept: OUTPATIENT SERVICES | Facility: HOSPITAL | Age: 30
LOS: 1 days | End: 2025-05-23
Payer: COMMERCIAL

## 2025-05-23 ENCOUNTER — NON-APPOINTMENT (OUTPATIENT)
Age: 30
End: 2025-05-23

## 2025-05-23 VITALS
HEIGHT: 65 IN | DIASTOLIC BLOOD PRESSURE: 72 MMHG | BODY MASS INDEX: 26.35 KG/M2 | SYSTOLIC BLOOD PRESSURE: 113 MMHG | WEIGHT: 158.13 LBS

## 2025-05-23 DIAGNOSIS — Z34.90 ENCOUNTER FOR SUPERVISION OF NORMAL PREGNANCY, UNSPECIFIED, UNSPECIFIED TRIMESTER: ICD-10-CM

## 2025-05-23 DIAGNOSIS — Z98.890 OTHER SPECIFIED POSTPROCEDURAL STATES: Chronic | ICD-10-CM

## 2025-05-23 LAB
BILIRUB UR QL STRIP: NORMAL
CLARITY UR: CLEAR
COLLECTION METHOD: NORMAL
GLUCOSE UR-MCNC: NORMAL
HCG UR QL: 0.2 EU/DL
HGB UR QL STRIP.AUTO: NORMAL
KETONES UR-MCNC: NORMAL
LEUKOCYTE ESTERASE UR QL STRIP: NORMAL
NITRITE UR QL STRIP: NORMAL
PH UR STRIP: 5
PROT UR STRIP-MCNC: NORMAL
SP GR UR STRIP: 1.03

## 2025-05-23 PROCEDURE — 99213 OFFICE O/P EST LOW 20 MIN: CPT

## 2025-05-23 PROCEDURE — 81002 URINALYSIS NONAUTO W/O SCOPE: CPT

## 2025-05-27 ENCOUNTER — NON-APPOINTMENT (OUTPATIENT)
Age: 30
End: 2025-05-27

## 2025-05-27 DIAGNOSIS — Z34.92 ENCOUNTER FOR SUPERVISION OF NORMAL PREGNANCY, UNSPECIFIED, SECOND TRIMESTER: ICD-10-CM

## 2025-06-23 ENCOUNTER — APPOINTMENT (OUTPATIENT)
Dept: OTOLARYNGOLOGY | Facility: CLINIC | Age: 30
End: 2025-06-23

## 2025-06-24 ENCOUNTER — OUTPATIENT (OUTPATIENT)
Dept: OUTPATIENT SERVICES | Facility: HOSPITAL | Age: 30
LOS: 1 days | End: 2025-06-24
Payer: COMMERCIAL

## 2025-06-24 ENCOUNTER — APPOINTMENT (OUTPATIENT)
Dept: OBGYN | Facility: CLINIC | Age: 30
End: 2025-06-24
Payer: COMMERCIAL

## 2025-06-24 VITALS
BODY MASS INDEX: 27.49 KG/M2 | DIASTOLIC BLOOD PRESSURE: 75 MMHG | HEIGHT: 65 IN | SYSTOLIC BLOOD PRESSURE: 115 MMHG | WEIGHT: 165 LBS

## 2025-06-24 DIAGNOSIS — Z98.890 OTHER SPECIFIED POSTPROCEDURAL STATES: Chronic | ICD-10-CM

## 2025-06-24 DIAGNOSIS — Z34.90 ENCOUNTER FOR SUPERVISION OF NORMAL PREGNANCY, UNSPECIFIED, UNSPECIFIED TRIMESTER: ICD-10-CM

## 2025-06-24 PROCEDURE — 99213 OFFICE O/P EST LOW 20 MIN: CPT

## 2025-06-24 PROCEDURE — 81002 URINALYSIS NONAUTO W/O SCOPE: CPT

## 2025-06-25 ENCOUNTER — OUTPATIENT (OUTPATIENT)
Dept: OUTPATIENT SERVICES | Facility: HOSPITAL | Age: 30
LOS: 1 days | End: 2025-06-25
Payer: COMMERCIAL

## 2025-06-25 DIAGNOSIS — Z34.92 ENCOUNTER FOR SUPERVISION OF NORMAL PREGNANCY, UNSPECIFIED, SECOND TRIMESTER: ICD-10-CM

## 2025-06-25 DIAGNOSIS — Z98.890 OTHER SPECIFIED POSTPROCEDURAL STATES: Chronic | ICD-10-CM

## 2025-06-25 LAB
BILIRUB UR QL STRIP: NEGATIVE
CLARITY UR: CLEAR
COLLECTION METHOD: NORMAL
GLUCOSE UR-MCNC: NEGATIVE
HCG UR QL: 0.2 EU/DL
HGB UR QL STRIP.AUTO: NEGATIVE
KETONES UR-MCNC: NEGATIVE
LEUKOCYTE ESTERASE UR QL STRIP: NEGATIVE
NITRITE UR QL STRIP: NEGATIVE
PH UR STRIP: 6.5
PROT UR STRIP-MCNC: NEGATIVE
SP GR UR STRIP: 1.01

## 2025-06-25 PROCEDURE — 82105 ALPHA-FETOPROTEIN SERUM: CPT

## 2025-06-26 DIAGNOSIS — Z34.92 ENCOUNTER FOR SUPERVISION OF NORMAL PREGNANCY, UNSPECIFIED, SECOND TRIMESTER: ICD-10-CM

## 2025-06-26 LAB
2ND TRIMESTER 4 SCREEN SERPL-IMP: NO
AFP ADJ MOM SERPL: 0.54
AFP INTERP SERPL-IMP: NORMAL
AFP INTERP SERPL-IMP: NORMAL
AFP MOM CUT-OFF: 2.5
AFP PERCENTILE: 2.6
AFP SERPL-ACNC: 24.22 NG/ML
CARBAMAZEPINE?: NO
CURRENT SMOKER: NORMAL
DIABETES STATUS PATIENT: NORMAL
GA: NORMAL
GESTATIONAL AGE METHOD: NORMAL
HX OF NTD NARR: NORMAL
MULTIPLE PREGNANCY: NORMAL
NEURAL TUBE DEFECT RISK FETUS: NORMAL
NEURAL TUBE DEFECT RISK POP: NORMAL
TEST PERFORMANCE INFO SPEC: NORMAL
VALPROIC ACID?: NORMAL

## 2025-07-03 ENCOUNTER — APPOINTMENT (OUTPATIENT)
Dept: ANTEPARTUM | Facility: CLINIC | Age: 30
End: 2025-07-03
Payer: COMMERCIAL

## 2025-07-03 ENCOUNTER — OUTPATIENT (OUTPATIENT)
Dept: OUTPATIENT SERVICES | Facility: HOSPITAL | Age: 30
LOS: 1 days | End: 2025-07-03
Payer: COMMERCIAL

## 2025-07-03 ENCOUNTER — ASOB RESULT (OUTPATIENT)
Age: 30
End: 2025-07-03

## 2025-07-03 DIAGNOSIS — Z98.890 OTHER SPECIFIED POSTPROCEDURAL STATES: Chronic | ICD-10-CM

## 2025-07-03 DIAGNOSIS — O44.42 LOW LYING PLACENTA NOS OR WITHOUT HEMORRHAGE, SECOND TRIMESTER: ICD-10-CM

## 2025-07-03 DIAGNOSIS — Z34.90 ENCOUNTER FOR SUPERVISION OF NORMAL PREGNANCY, UNSPECIFIED, UNSPECIFIED TRIMESTER: ICD-10-CM

## 2025-07-03 DIAGNOSIS — Z3A.20 20 WEEKS GESTATION OF PREGNANCY: ICD-10-CM

## 2025-07-03 DIAGNOSIS — Z36.3 ENCOUNTER FOR ANTENATAL SCREENING FOR MALFORMATIONS: ICD-10-CM

## 2025-07-03 PROCEDURE — 76817 TRANSVAGINAL US OBSTETRIC: CPT

## 2025-07-03 PROCEDURE — 76817 TRANSVAGINAL US OBSTETRIC: CPT | Mod: 26

## 2025-07-03 PROCEDURE — 76805 OB US >/= 14 WKS SNGL FETUS: CPT | Mod: 26

## 2025-07-03 PROCEDURE — 76805 OB US >/= 14 WKS SNGL FETUS: CPT

## 2025-07-11 ENCOUNTER — OUTPATIENT (OUTPATIENT)
Dept: OUTPATIENT SERVICES | Facility: HOSPITAL | Age: 30
LOS: 1 days | End: 2025-07-11
Payer: COMMERCIAL

## 2025-07-11 ENCOUNTER — APPOINTMENT (OUTPATIENT)
Dept: OBGYN | Facility: CLINIC | Age: 30
End: 2025-07-11

## 2025-07-11 ENCOUNTER — NON-APPOINTMENT (OUTPATIENT)
Age: 30
End: 2025-07-11

## 2025-07-11 VITALS
WEIGHT: 169.19 LBS | SYSTOLIC BLOOD PRESSURE: 113 MMHG | BODY MASS INDEX: 28.19 KG/M2 | HEIGHT: 65 IN | DIASTOLIC BLOOD PRESSURE: 77 MMHG

## 2025-07-11 DIAGNOSIS — Z34.90 ENCOUNTER FOR SUPERVISION OF NORMAL PREGNANCY, UNSPECIFIED, UNSPECIFIED TRIMESTER: ICD-10-CM

## 2025-07-11 DIAGNOSIS — Z98.890 OTHER SPECIFIED POSTPROCEDURAL STATES: Chronic | ICD-10-CM

## 2025-07-11 PROCEDURE — T1013: CPT

## 2025-07-11 PROCEDURE — 99213 OFFICE O/P EST LOW 20 MIN: CPT

## 2025-07-11 RX ORDER — LIDOCAINE 50 MG/G
5 CREAM TOPICAL
Qty: 1 | Refills: 0 | Status: ACTIVE | COMMUNITY
Start: 2025-07-11 | End: 1900-01-01

## 2025-07-21 DIAGNOSIS — Z34.93 ENCOUNTER FOR SUPERVISION OF NORMAL PREGNANCY, UNSPECIFIED, THIRD TRIMESTER: ICD-10-CM

## 2025-07-29 ENCOUNTER — OUTPATIENT (OUTPATIENT)
Dept: OUTPATIENT SERVICES | Facility: HOSPITAL | Age: 30
LOS: 1 days | End: 2025-07-29
Payer: COMMERCIAL

## 2025-07-29 ENCOUNTER — APPOINTMENT (OUTPATIENT)
Dept: OBGYN | Facility: CLINIC | Age: 30
End: 2025-07-29
Payer: COMMERCIAL

## 2025-07-29 VITALS
HEIGHT: 65 IN | BODY MASS INDEX: 29.23 KG/M2 | DIASTOLIC BLOOD PRESSURE: 71 MMHG | SYSTOLIC BLOOD PRESSURE: 111 MMHG | WEIGHT: 175.44 LBS

## 2025-07-29 DIAGNOSIS — Z98.890 OTHER SPECIFIED POSTPROCEDURAL STATES: Chronic | ICD-10-CM

## 2025-07-29 DIAGNOSIS — Z34.90 ENCOUNTER FOR SUPERVISION OF NORMAL PREGNANCY, UNSPECIFIED, UNSPECIFIED TRIMESTER: ICD-10-CM

## 2025-07-29 DIAGNOSIS — Z34.92 ENCOUNTER FOR SUPERVISION OF NORMAL PREGNANCY, UNSPECIFIED, SECOND TRIMESTER: ICD-10-CM

## 2025-07-29 PROCEDURE — 99213 OFFICE O/P EST LOW 20 MIN: CPT

## 2025-07-29 PROCEDURE — 81002 URINALYSIS NONAUTO W/O SCOPE: CPT

## 2025-07-30 DIAGNOSIS — Z34.92 ENCOUNTER FOR SUPERVISION OF NORMAL PREGNANCY, UNSPECIFIED, SECOND TRIMESTER: ICD-10-CM

## 2025-07-31 ENCOUNTER — OUTPATIENT (OUTPATIENT)
Dept: OUTPATIENT SERVICES | Facility: HOSPITAL | Age: 30
LOS: 1 days | End: 2025-07-31
Payer: COMMERCIAL

## 2025-07-31 DIAGNOSIS — Z34.92 ENCOUNTER FOR SUPERVISION OF NORMAL PREGNANCY, UNSPECIFIED, SECOND TRIMESTER: ICD-10-CM

## 2025-07-31 DIAGNOSIS — Z98.890 OTHER SPECIFIED POSTPROCEDURAL STATES: Chronic | ICD-10-CM

## 2025-07-31 PROCEDURE — 85025 COMPLETE CBC W/AUTO DIFF WBC: CPT

## 2025-07-31 PROCEDURE — 82950 GLUCOSE TEST: CPT

## 2025-08-01 DIAGNOSIS — Z34.92 ENCOUNTER FOR SUPERVISION OF NORMAL PREGNANCY, UNSPECIFIED, SECOND TRIMESTER: ICD-10-CM

## 2025-08-05 LAB
BASOPHILS # BLD AUTO: 0.02 K/UL
BASOPHILS NFR BLD AUTO: 0.4 %
EOSINOPHIL # BLD AUTO: 0.13 K/UL
EOSINOPHIL NFR BLD AUTO: 2.3 %
GLUCOSE 1H P 50 G GLC PO SERPL-MCNC: 113 MG/DL
HCT VFR BLD CALC: 38.5 %
HGB BLD-MCNC: 12.2 G/DL
IMM GRANULOCYTES NFR BLD AUTO: 0.7 %
LYMPHOCYTES # BLD AUTO: 1.61 K/UL
LYMPHOCYTES NFR BLD AUTO: 28.6 %
MAN DIFF?: NORMAL
MCHC RBC-ENTMCNC: 30.4 PG
MCHC RBC-ENTMCNC: 31.7 G/DL
MCV RBC AUTO: 96 FL
MONOCYTES # BLD AUTO: 0.41 K/UL
MONOCYTES NFR BLD AUTO: 7.3 %
NEUTROPHILS # BLD AUTO: 3.41 K/UL
NEUTROPHILS NFR BLD AUTO: 60.7 %
PLATELET # BLD AUTO: 186 K/UL
PMV BLD AUTO: 0 /100 WBCS
PMV BLD: 11.2 FL
RBC # BLD: 4.01 M/UL
RBC # FLD: 13.6 %
WBC # FLD AUTO: 5.62 K/UL

## 2025-08-19 ENCOUNTER — APPOINTMENT (OUTPATIENT)
Dept: OBGYN | Facility: CLINIC | Age: 30
End: 2025-08-19
Payer: COMMERCIAL

## 2025-08-19 ENCOUNTER — OUTPATIENT (OUTPATIENT)
Dept: OUTPATIENT SERVICES | Facility: HOSPITAL | Age: 30
LOS: 1 days | End: 2025-08-19
Payer: COMMERCIAL

## 2025-08-19 VITALS
WEIGHT: 178 LBS | BODY MASS INDEX: 29.66 KG/M2 | SYSTOLIC BLOOD PRESSURE: 105 MMHG | DIASTOLIC BLOOD PRESSURE: 69 MMHG | HEIGHT: 65 IN

## 2025-08-19 DIAGNOSIS — Z34.92 ENCOUNTER FOR SUPERVISION OF NORMAL PREGNANCY, UNSPECIFIED, SECOND TRIMESTER: ICD-10-CM

## 2025-08-19 DIAGNOSIS — Z98.890 OTHER SPECIFIED POSTPROCEDURAL STATES: Chronic | ICD-10-CM

## 2025-08-19 DIAGNOSIS — Z34.90 ENCOUNTER FOR SUPERVISION OF NORMAL PREGNANCY, UNSPECIFIED, UNSPECIFIED TRIMESTER: ICD-10-CM

## 2025-08-19 LAB
BILIRUB UR QL STRIP: NEGATIVE
CLARITY UR: CLEAR
COLLECTION METHOD: NORMAL
GLUCOSE UR-MCNC: NEGATIVE
HCG UR QL: 0.2 EU/DL
HGB UR QL STRIP.AUTO: NEGATIVE
KETONES UR-MCNC: NEGATIVE
LEUKOCYTE ESTERASE UR QL STRIP: NEGATIVE
NITRITE UR QL STRIP: NEGATIVE
PH UR STRIP: 8
PROT UR STRIP-MCNC: NEGATIVE
SP GR UR STRIP: 1.01

## 2025-08-19 PROCEDURE — 99213 OFFICE O/P EST LOW 20 MIN: CPT

## 2025-08-19 PROCEDURE — 81002 URINALYSIS NONAUTO W/O SCOPE: CPT

## 2025-08-20 DIAGNOSIS — Z00.00 ENCOUNTER FOR GENERAL ADULT MEDICAL EXAMINATION WITHOUT ABNORMAL FINDINGS: ICD-10-CM

## 2025-08-20 DIAGNOSIS — Z34.92 ENCOUNTER FOR SUPERVISION OF NORMAL PREGNANCY, UNSPECIFIED, SECOND TRIMESTER: ICD-10-CM

## 2025-08-29 ENCOUNTER — ASOB RESULT (OUTPATIENT)
Age: 30
End: 2025-08-29

## 2025-08-29 ENCOUNTER — OUTPATIENT (OUTPATIENT)
Dept: OUTPATIENT SERVICES | Facility: HOSPITAL | Age: 30
LOS: 1 days | End: 2025-08-29
Payer: COMMERCIAL

## 2025-08-29 ENCOUNTER — APPOINTMENT (OUTPATIENT)
Dept: ANTEPARTUM | Facility: CLINIC | Age: 30
End: 2025-08-29
Payer: COMMERCIAL

## 2025-08-29 DIAGNOSIS — Z98.890 OTHER SPECIFIED POSTPROCEDURAL STATES: Chronic | ICD-10-CM

## 2025-08-29 DIAGNOSIS — Z34.90 ENCOUNTER FOR SUPERVISION OF NORMAL PREGNANCY, UNSPECIFIED, UNSPECIFIED TRIMESTER: ICD-10-CM

## 2025-08-29 PROCEDURE — 76819 FETAL BIOPHYS PROFIL W/O NST: CPT | Mod: 26

## 2025-08-29 PROCEDURE — 76816 OB US FOLLOW-UP PER FETUS: CPT

## 2025-08-29 PROCEDURE — 76816 OB US FOLLOW-UP PER FETUS: CPT | Mod: 26

## 2025-08-29 PROCEDURE — 76819 FETAL BIOPHYS PROFIL W/O NST: CPT

## 2025-09-03 ENCOUNTER — OUTPATIENT (OUTPATIENT)
Dept: OUTPATIENT SERVICES | Facility: HOSPITAL | Age: 30
LOS: 1 days | End: 2025-09-03
Payer: COMMERCIAL

## 2025-09-03 DIAGNOSIS — Z34.92 ENCOUNTER FOR SUPERVISION OF NORMAL PREGNANCY, UNSPECIFIED, SECOND TRIMESTER: ICD-10-CM

## 2025-09-03 DIAGNOSIS — Z98.890 OTHER SPECIFIED POSTPROCEDURAL STATES: Chronic | ICD-10-CM

## 2025-09-03 PROCEDURE — 86850 RBC ANTIBODY SCREEN: CPT

## 2025-09-03 PROCEDURE — 86901 BLOOD TYPING SEROLOGIC RH(D): CPT

## 2025-09-03 PROCEDURE — 86900 BLOOD TYPING SEROLOGIC ABO: CPT

## 2025-09-04 DIAGNOSIS — Z34.92 ENCOUNTER FOR SUPERVISION OF NORMAL PREGNANCY, UNSPECIFIED, SECOND TRIMESTER: ICD-10-CM

## 2025-09-05 ENCOUNTER — OUTPATIENT (OUTPATIENT)
Dept: OUTPATIENT SERVICES | Facility: HOSPITAL | Age: 30
LOS: 1 days | End: 2025-09-05
Payer: COMMERCIAL

## 2025-09-05 ENCOUNTER — APPOINTMENT (OUTPATIENT)
Dept: OBGYN | Facility: CLINIC | Age: 30
End: 2025-09-05

## 2025-09-05 ENCOUNTER — NON-APPOINTMENT (OUTPATIENT)
Age: 30
End: 2025-09-05

## 2025-09-05 VITALS
SYSTOLIC BLOOD PRESSURE: 114 MMHG | BODY MASS INDEX: 30.49 KG/M2 | HEIGHT: 65 IN | WEIGHT: 183 LBS | DIASTOLIC BLOOD PRESSURE: 77 MMHG

## 2025-09-05 DIAGNOSIS — Z34.90 ENCOUNTER FOR SUPERVISION OF NORMAL PREGNANCY, UNSPECIFIED, UNSPECIFIED TRIMESTER: ICD-10-CM

## 2025-09-05 DIAGNOSIS — Z98.890 OTHER SPECIFIED POSTPROCEDURAL STATES: Chronic | ICD-10-CM

## 2025-09-05 DIAGNOSIS — O44.43 LOW LYING PLACENTA NOS OR WITHOUT HEMORRHAGE, THIRD TRIMESTER: ICD-10-CM

## 2025-09-05 DIAGNOSIS — Z3A.28 28 WEEKS GESTATION OF PREGNANCY: ICD-10-CM

## 2025-09-05 DIAGNOSIS — Z03.74 ENCOUNTER FOR SUSPECTED PROBLEM WITH FETAL GROWTH RULED OUT: ICD-10-CM

## 2025-09-05 PROCEDURE — 99213 OFFICE O/P EST LOW 20 MIN: CPT

## 2025-09-05 PROCEDURE — 90384 RH IG FULL-DOSE IM: CPT

## 2025-09-05 PROCEDURE — 81002 URINALYSIS NONAUTO W/O SCOPE: CPT

## 2025-09-08 DIAGNOSIS — Z34.90 ENCOUNTER FOR SUPERVISION OF NORMAL PREGNANCY, UNSPECIFIED, UNSPECIFIED TRIMESTER: ICD-10-CM

## 2025-09-10 LAB
BILIRUB UR QL STRIP: NORMAL
CLARITY UR: CLEAR
COLLECTION METHOD: NORMAL
GLUCOSE UR-MCNC: NORMAL
HCG UR QL: 0.2 EU/DL
HGB UR QL STRIP.AUTO: NORMAL
KETONES UR-MCNC: NORMAL
LEUKOCYTE ESTERASE UR QL STRIP: NORMAL
NITRITE UR QL STRIP: NORMAL
PH UR STRIP: 6
PROT UR STRIP-MCNC: NORMAL
SP GR UR STRIP: 1.01

## 2025-09-12 ENCOUNTER — APPOINTMENT (OUTPATIENT)
Dept: OBGYN | Facility: CLINIC | Age: 30
End: 2025-09-12

## 2025-09-16 ENCOUNTER — NON-APPOINTMENT (OUTPATIENT)
Age: 30
End: 2025-09-16

## 2025-09-16 ENCOUNTER — APPOINTMENT (OUTPATIENT)
Dept: OBGYN | Facility: CLINIC | Age: 30
End: 2025-09-16
Payer: COMMERCIAL

## 2025-09-16 VITALS — WEIGHT: 186 LBS | DIASTOLIC BLOOD PRESSURE: 80 MMHG | BODY MASS INDEX: 30.95 KG/M2 | SYSTOLIC BLOOD PRESSURE: 118 MMHG

## 2025-09-16 DIAGNOSIS — Z34.93 ENCOUNTER FOR SUPERVISION OF NORMAL PREGNANCY, UNSPECIFIED, THIRD TRIMESTER: ICD-10-CM

## 2025-09-16 PROCEDURE — 99213 OFFICE O/P EST LOW 20 MIN: CPT

## 2025-09-17 LAB
BILIRUB UR QL STRIP: NORMAL
CLARITY UR: CLEAR
COLLECTION METHOD: NORMAL
GLUCOSE UR-MCNC: NORMAL
HCG UR QL: NORMAL EU/DL
HGB UR QL STRIP.AUTO: NORMAL
KETONES UR-MCNC: NORMAL
LEUKOCYTE ESTERASE UR QL STRIP: NORMAL
NITRITE UR QL STRIP: NORMAL
PH UR STRIP: NORMAL
PROT UR STRIP-MCNC: NORMAL
SP GR UR STRIP: NORMAL

## 2025-09-20 LAB
BASOPHILS # BLD AUTO: 0.01 K/UL
BASOPHILS NFR BLD AUTO: 0.1 %
EOSINOPHIL # BLD AUTO: 0.09 K/UL
EOSINOPHIL NFR BLD AUTO: 1.3 %
HCT VFR BLD CALC: 38.8 %
HGB BLD-MCNC: 12.1 G/DL
HIV1+2 AB SPEC QL IA.RAPID: NONREACTIVE
IMM GRANULOCYTES NFR BLD AUTO: 0.4 %
LYMPHOCYTES # BLD AUTO: 1.9 K/UL
LYMPHOCYTES NFR BLD AUTO: 28 %
MAN DIFF?: NORMAL
MCHC RBC-ENTMCNC: 31.2 G/DL
MCHC RBC-ENTMCNC: 31.4 PG
MCV RBC AUTO: 100.8 FL
MONOCYTES # BLD AUTO: 0.69 K/UL
MONOCYTES NFR BLD AUTO: 10.2 %
NEUTROPHILS # BLD AUTO: 4.07 K/UL
NEUTROPHILS NFR BLD AUTO: 60 %
PLATELET # BLD AUTO: 181 K/UL
PMV BLD AUTO: 0 /100 WBCS
PMV BLD: 11.1 FL
RBC # BLD: 3.85 M/UL
RBC # FLD: 14.3 %
T PALLIDUM AB SER QL IA: NEGATIVE
WBC # FLD AUTO: 6.79 K/UL